# Patient Record
Sex: MALE | Race: BLACK OR AFRICAN AMERICAN | NOT HISPANIC OR LATINO | URBAN - METROPOLITAN AREA
[De-identification: names, ages, dates, MRNs, and addresses within clinical notes are randomized per-mention and may not be internally consistent; named-entity substitution may affect disease eponyms.]

---

## 2020-03-02 ENCOUNTER — OUTPATIENT (OUTPATIENT)
Dept: OUTPATIENT SERVICES | Facility: HOSPITAL | Age: 65
LOS: 1 days | Discharge: HOME | End: 2020-03-02
Payer: COMMERCIAL

## 2020-03-02 ENCOUNTER — EMERGENCY (EMERGENCY)
Facility: HOSPITAL | Age: 65
LOS: 0 days | Discharge: HOME | End: 2020-03-02
Attending: EMERGENCY MEDICINE
Payer: COMMERCIAL

## 2020-03-02 VITALS
RESPIRATION RATE: 18 BRPM | OXYGEN SATURATION: 98 % | WEIGHT: 182.76 LBS | TEMPERATURE: 98 F | HEART RATE: 98 BPM | HEIGHT: 68 IN | SYSTOLIC BLOOD PRESSURE: 112 MMHG | DIASTOLIC BLOOD PRESSURE: 53 MMHG

## 2020-03-02 VITALS
SYSTOLIC BLOOD PRESSURE: 137 MMHG | HEIGHT: 68 IN | TEMPERATURE: 98 F | DIASTOLIC BLOOD PRESSURE: 90 MMHG | OXYGEN SATURATION: 96 % | RESPIRATION RATE: 16 BRPM | HEART RATE: 92 BPM | WEIGHT: 179.9 LBS

## 2020-03-02 DIAGNOSIS — R03.0 ELEVATED BLOOD-PRESSURE READING, WITHOUT DIAGNOSIS OF HYPERTENSION: ICD-10-CM

## 2020-03-02 DIAGNOSIS — Z01.818 ENCOUNTER FOR OTHER PREPROCEDURAL EXAMINATION: ICD-10-CM

## 2020-03-02 DIAGNOSIS — R53.1 WEAKNESS: ICD-10-CM

## 2020-03-02 DIAGNOSIS — I11.0 HYPERTENSIVE HEART DISEASE WITH HEART FAILURE: ICD-10-CM

## 2020-03-02 DIAGNOSIS — E78.5 HYPERLIPIDEMIA, UNSPECIFIED: ICD-10-CM

## 2020-03-02 DIAGNOSIS — I25.10 ATHEROSCLEROTIC HEART DISEASE OF NATIVE CORONARY ARTERY WITHOUT ANGINA PECTORIS: ICD-10-CM

## 2020-03-02 DIAGNOSIS — Z95.5 PRESENCE OF CORONARY ANGIOPLASTY IMPLANT AND GRAFT: Chronic | ICD-10-CM

## 2020-03-02 DIAGNOSIS — Z95.5 PRESENCE OF CORONARY ANGIOPLASTY IMPLANT AND GRAFT: ICD-10-CM

## 2020-03-02 DIAGNOSIS — I10 ESSENTIAL (PRIMARY) HYPERTENSION: ICD-10-CM

## 2020-03-02 DIAGNOSIS — Z79.82 LONG TERM (CURRENT) USE OF ASPIRIN: ICD-10-CM

## 2020-03-02 DIAGNOSIS — M16.12 UNILATERAL PRIMARY OSTEOARTHRITIS, LEFT HIP: ICD-10-CM

## 2020-03-02 DIAGNOSIS — I50.40 UNSPECIFIED COMBINED SYSTOLIC (CONGESTIVE) AND DIASTOLIC (CONGESTIVE) HEART FAILURE: ICD-10-CM

## 2020-03-02 DIAGNOSIS — I25.5 ISCHEMIC CARDIOMYOPATHY: ICD-10-CM

## 2020-03-02 LAB
ANION GAP SERPL CALC-SCNC: 5 MMOL/L — SIGNIFICANT CHANGE UP (ref 5–17)
APTT BLD: 35.1 SEC — SIGNIFICANT CHANGE UP (ref 28.5–37)
BLD GP AB SCN SERPL QL: SIGNIFICANT CHANGE UP
BUN SERPL-MCNC: 14 MG/DL — SIGNIFICANT CHANGE UP (ref 7–23)
CALCIUM SERPL-MCNC: 8.9 MG/DL — SIGNIFICANT CHANGE UP (ref 8.5–10.1)
CHLORIDE SERPL-SCNC: 108 MMOL/L — SIGNIFICANT CHANGE UP (ref 96–108)
CO2 SERPL-SCNC: 27 MMOL/L — SIGNIFICANT CHANGE UP (ref 22–31)
CREAT SERPL-MCNC: 1.12 MG/DL — SIGNIFICANT CHANGE UP (ref 0.5–1.3)
GLUCOSE SERPL-MCNC: 79 MG/DL — SIGNIFICANT CHANGE UP (ref 70–99)
HCT VFR BLD CALC: 41.1 % — SIGNIFICANT CHANGE UP (ref 39–50)
HGB BLD-MCNC: 13 G/DL — SIGNIFICANT CHANGE UP (ref 13–17)
INR BLD: 1.17 RATIO — HIGH (ref 0.88–1.16)
MCHC RBC-ENTMCNC: 26.3 PG — LOW (ref 27–34)
MCHC RBC-ENTMCNC: 31.6 GM/DL — LOW (ref 32–36)
MCV RBC AUTO: 83 FL — SIGNIFICANT CHANGE UP (ref 80–100)
NRBC # BLD: 0 /100 WBCS — SIGNIFICANT CHANGE UP (ref 0–0)
PLATELET # BLD AUTO: 203 K/UL — SIGNIFICANT CHANGE UP (ref 150–400)
POTASSIUM SERPL-MCNC: 3.6 MMOL/L — SIGNIFICANT CHANGE UP (ref 3.5–5.3)
POTASSIUM SERPL-SCNC: 3.6 MMOL/L — SIGNIFICANT CHANGE UP (ref 3.5–5.3)
PROTHROM AB SERPL-ACNC: 13.2 SEC — HIGH (ref 10–12.9)
RBC # BLD: 4.95 M/UL — SIGNIFICANT CHANGE UP (ref 4.2–5.8)
RBC # FLD: 14.5 % — SIGNIFICANT CHANGE UP (ref 10.3–14.5)
SODIUM SERPL-SCNC: 140 MMOL/L — SIGNIFICANT CHANGE UP (ref 135–145)
WBC # BLD: 4.19 K/UL — SIGNIFICANT CHANGE UP (ref 3.8–10.5)
WBC # FLD AUTO: 4.19 K/UL — SIGNIFICANT CHANGE UP (ref 3.8–10.5)

## 2020-03-02 PROCEDURE — 99283 EMERGENCY DEPT VISIT LOW MDM: CPT

## 2020-03-02 PROCEDURE — 93010 ELECTROCARDIOGRAM REPORT: CPT

## 2020-03-02 RX ORDER — METOPROLOL TARTRATE 50 MG
1 TABLET ORAL
Qty: 0 | Refills: 0 | DISCHARGE

## 2020-03-02 NOTE — OCCUPATIONAL THERAPY INITIAL EVALUATION ADULT - ANTICIPATED DISCHARGE DISPOSITION, OT EVAL
STR to prevent falls, improve balance, muscle strength, ROM and endurance in order for the pt to perform ADL management and functional mobility in a safe manner.

## 2020-03-02 NOTE — PHYSICAL THERAPY INITIAL EVALUATION ADULT - ACTIVE RANGE OF MOTION EXAMINATION, REHAB EVAL
bilateral lower extremity Active ROM was WNL (within normal limits)/cece. upper extremity Active ROM was WNL (within normal limits)

## 2020-03-02 NOTE — PHYSICAL THERAPY INITIAL EVALUATION ADULT - PERTINENT HX OF CURRENT PROBLEM, REHAB EVAL
Patient attends pre-op testing today following consult c Dr. Crane due to chronic pain to L hip. Elective L ALICIA is now scheduled in this facility for 3/16/2020.

## 2020-03-02 NOTE — OCCUPATIONAL THERAPY INITIAL EVALUATION ADULT - FINE MOTOR COORDINATION, RIGHT HAND, DIADOCHOKINESIS SKILLS, OT EVAL
Spoke with patient. Left sided back/flank pain. Hx kidney stones that started similarly. Urine flow is normal. Does not believe he has passed stone. No fever. No abd pain. Advise dto monitor fever, urinary flow and pain. If worsens, go to ER. Pt agreeable. Will try Flomax and Vicodin.
normal performance

## 2020-03-02 NOTE — ED PROVIDER NOTE - PMH
CAD (coronary artery disease)  stent 2008, 2103 ( rca)  Diastolic heart failure    HTN (hypertension)    Hyperlipidemia    Ischemic cardiomyopathy    Systolic heart failure

## 2020-03-02 NOTE — OCCUPATIONAL THERAPY INITIAL EVALUATION ADULT - PERTINENT HX OF CURRENT PROBLEM, REHAB EVAL
Pt is slated for elective surgery for left THR at a later date with MD Crane due to OA, chronic pain and DJD. Pt denies any recent fall but reports constant buckling in LLE.

## 2020-03-02 NOTE — H&P PST ADULT - PULMONARY EMBOLUS
no

## 2020-03-02 NOTE — ED PROVIDER NOTE - PATIENT PORTAL LINK FT
You can access the FollowMyHealth Patient Portal offered by [facility] by registering at the following website: [email or web address]. By joining [facility]’s FollowMyHealth portal, you will also be able to view your health information using other applications (apps) compatible with our system.

## 2020-03-02 NOTE — H&P PST ADULT - NSICDXPROBLEM_GEN_ALL_CORE_FT
PROBLEM DIAGNOSES  Problem: Unilateral primary osteoarthritis, left hip  Assessment and Plan: left total hip replacement on 3/16/2020    Problem: Preop examination  Assessment and Plan: labs - cbc,pt/ptt,bmp,t&s,nose cx,ekg  M/C required  preop 3 day hibiclens instruction reviewed and given .instructed on if  nose cx positive use mupuricin 5 days and checklist given  take routine meds DOS with sips of water. avoid NSAID and OTC supplements. verbalized understanding  information on proper nutrition , increase protein and better food choices provided in packet

## 2020-03-02 NOTE — ED PROVIDER NOTE - CLINICAL SUMMARY MEDICAL DECISION MAKING FREE TEXT BOX
Weakness s/p HTN medications. PST today, had all labs drawn, WNL. Pt feels like his normal self after rest after HTN medications and denies CP/SOB, wants to go home. Will DC with f/u PMD. Scheduled for hip replacement 3/16.

## 2020-03-02 NOTE — H&P PST ADULT - HISTORY OF PRESENT ILLNESS
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 He c/o of severe pain to his left hip.  He is scheduled for a left hip replacement on 3/16/2020.    Goal:  To dance, to take kids to the park. 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 He c/o of severe pain to his left hip.  He is scheduled for a left hip replacement on 3/16/2020.    Goal:  To dance, to take kids to the park.

## 2020-03-02 NOTE — ED PROVIDER NOTE - CARE PLAN
Principal Discharge DX:	Weakness Principal Discharge DX:	Weakness  Secondary Diagnosis:	HTN (hypertension)

## 2020-03-02 NOTE — ED PROVIDER NOTE - NSFOLLOWUPINSTRUCTIONS_ED_ALL_ED_FT
You were seen in the ER for weakness.  You took medications prior to feeling weak.  Return to ER for life threatening emergencies.  Follow up with your doctor.

## 2020-03-02 NOTE — PHYSICAL THERAPY INITIAL EVALUATION ADULT - ADDITIONAL COMMENTS
Pt lives with his wife and children (whom can provide limited assist upon D/C home) in an apt bldg c elevator, no entry step, 1 level inside. Pt has a tub/shower combo with a retractable shower head, comfort toilet seat height, & +grab bar. Pt states he is currently independent with all functional mobility including community ambulation with straight cane with prosthesis to RLE. Pt states he is independent with ADL's as well c slight difficulty with lower body dressing. Pt is right hand dominant, wears eye glasses, drives, & is retired. Pt reports daily 8/10 pain & states it is worse with any activity. Pt endorses taking narcotics for pain management. Goal of therapy: manage pain & improve functional mobility.

## 2020-03-02 NOTE — ED PROVIDER NOTE - NS ED ROS FT
ROS: denies HA, dizziness, fevers/chills, nausea/vomiting, chest pain, SOB, diaphoresis, abdominal pain, diarrhea, joint pain, neuro deficits, dysuria/hematuria, rash    +weakness, resolved

## 2020-03-02 NOTE — OCCUPATIONAL THERAPY INITIAL EVALUATION ADULT - LIVES WITH, PROFILE
in an apartment with no steps to enter. Once inside, pt takes the elevator to his floor to reach his dwelling. The bathroom is handicap accessible and has a tub/shower combination, grab bars, retractable shower head and comfort toilet. Pt states that 3:1 commode can fit over the toilet./children/spouse

## 2020-03-02 NOTE — OCCUPATIONAL THERAPY INITIAL EVALUATION ADULT - GENERAL OBSERVATIONS, REHAB EVAL
Chart reviewed. Patient encountered seated in wheelchair in [facility] waiting area with NAD. Patient underwent occupational therapy pre-operative consultation to determine current functional ADL limitations in order to provide the right equipment for patient to perform functional ADL post operation.

## 2020-03-02 NOTE — H&P PST ADULT - NSICDXPASTMEDICALHX_GEN_ALL_CORE_FT
PAST MEDICAL HISTORY:  CAD (coronary artery disease) stent 2008, 2103 ( rca)    Diastolic heart failure     HTN (hypertension)     Hyperlipidemia     Ischemic cardiomyopathy     Systolic heart failure

## 2020-03-02 NOTE — OCCUPATIONAL THERAPY INITIAL EVALUATION ADULT - ADDITIONAL COMMENTS
At this time, pt is functioning in his roles, driving, self sufficient & ambulating independently with a SAC for community mobility. Pt states that he holds on to the walls at home so that he doesn't fall. 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 Pt has 8/10 pain at rest that exacerbates to 10/10 pain with prolonged activities, changes in weather and this affects his sleep. Pt takes Aleve PRN to alleviate pain. Pt is right hand dominant and wears glasses for reading/distance. At this time, pt is functioning in his roles, driving, self sufficient & ambulating independently with a SAC for community mobility. Pt states that he holds onto the walls at home so that he doesn't fall. 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 Pt has 8/10 pain in left hip at rest that exacerbates to 10/10 pain with prolonged activities, changes in weather and this affects his sleep. Pt takes Aleve PRN to alleviate pain. Pt is right hand dominant and wears glasses for reading/distance. At this time, pt is functioning in his roles, driving, self sufficient & ambulating independently with a SAC for community mobility. Pt states that he holds onto the walls at home so that he doesn't fall. 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 Pt has 8/10 pain in left hip at rest that exacerbates to 10/10 pain with prolonged activities, changes in weather and this affects his sleep. Pt takes Aleve PRN to alleviate pain. Pt is right hand dominant and wears glasses for reading/distance. At this time, pt is functioning in his roles, driving, self sufficient & ambulating independently with a SAC for community mobility. Pt states that he holds onto the walls at home so that he doesn't fall. 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 Pt has 8/10 pain in left hip at rest that exacerbates to 10/10 pain with prolonged activities, changes in weather and this affects his sleep. Pt takes Aleve PRN to alleviate pain. Pt is right hand dominant and wears glasses for reading/distance. Pt scores 80% of patient specific scale

## 2020-03-02 NOTE — PHYSICAL THERAPY INITIAL EVALUATION ADULT - GAIT DEVIATIONS NOTED, PT EVAL
decreased latisha/decreased step length/increased stride width/decreased stride length/decreased weight-shifting ability

## 2020-03-02 NOTE — OCCUPATIONAL THERAPY INITIAL EVALUATION ADULT - SOCIAL CONCERNS
Pt voiced concerns about his recovery. Pt states that his wife will only be able to assist him sometimes at home.

## 2020-03-02 NOTE — PHYSICAL THERAPY INITIAL EVALUATION ADULT - ASSISTIVE DEVICE FOR TRANSFER: STAND/SIT, REHAB EVAL
straight cane Xolair Pregnancy And Lactation Text: This medication is Pregnancy Category B and is considered safe during pregnancy. This medication is excreted in breast milk.

## 2020-03-02 NOTE — OCCUPATIONAL THERAPY INITIAL EVALUATION ADULT - COORDINATION ASSESSED, REHAB EVAL
finger to nose/heel to shin/intact, diminished in LLE due to pain and unable to perform in RLE due to prosthesis

## 2020-03-02 NOTE — ED PROVIDER NOTE - OBJECTIVE STATEMENT
65yo M with hx of CAD s/p stents, DHF/SHF, HTN/HLD presents today with weakness. He was at [facility], had all of the lab work and test done. His BP was elevated, took two home meds - HCTZ, metoprolol. Soon after his BP was lower, he was weaker. He was brought to ED for further eval. He feels better now after lying in bed. Denies CP/SOB, denies LOC. Feels comfortable going home.

## 2020-03-02 NOTE — H&P PST ADULT - ASSESSMENT
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 He c/o of severe pain to his left hip.  He is scheduled for a left hip replacement on 3/16/2020. 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 He c/o of severe pain to his left hip.  He is scheduled for a left hip replacement on 3/16/2020.    CAPRINI SCORE [CLOT]    AGE RELATED RISK FACTORS                                                       MOBILITY RELATED FACTORS  [ ] Age 41-60 years                                            (1 Point)                  [ ] Bed rest                                                        (1 Point)  [ x] Age: 61-74 years                                           (2 Points)                 [ ] Plaster cast                                                   (2 Points)  [ ] Age= 75 years                                              (3 Points)                 [ ] Bed bound for more than 72 hours                 (2 Points)    DISEASE RELATED RISK FACTORS                                               GENDER SPECIFIC FACTORS  [ ] Edema in the lower extremities                       (1 Point)                  [ ] Pregnancy                                                     (1 Point)  [ ] Varicose veins                                               (1 Point)                  [ ] Post-partum < 6 weeks                                   (1 Point)             [ ] BMI > 25 Kg/m2                                            (1 Point)                  [ ] Hormonal therapy  or oral contraception          (1 Point)                 [ ] Sepsis (in the previous month)                        (1 Point)                  [ ] History of pregnancy complications                 (1 point)  [ ] Pneumonia or serious lung disease                                               [ ] Unexplained or recurrent                     (1 Point)           (in the previous month)                               (1 Point)  [ ] Abnormal pulmonary function test                     (1 Point)                 SURGERY RELATED RISK FACTORS  [ ] Acute myocardial infarction                              (1 Point)                 [ ]  Section                                             (1 Point)  x[ ] Congestive heart failure (in the previous month)  (1 Point)               [ ] Minor surgery                                                  (1 Point)   [ ] Inflammatory bowel disease                             (1 Point)                 [ ] Arthroscopic surgery                                        (2 Points)  [ ] Central venous access                                      (2 Points)                [ ] General surgery lasting more than 45 minutes   (2 Points)       [ ] Stroke (in the previous month)                          (5 Points)               [x ] Elective arthroplasty                                         (5 Points)                                                                                                                                               HEMATOLOGY RELATED FACTORS                                                 TRAUMA RELATED RISK FACTORS  [ ] Prior episodes of VTE                                     (3 Points)                [ ] Fracture of the hip, pelvis, or leg                       (5 Points)  [ ] Positive family history for VTE                         (3 Points)                 [ ] Acute spinal cord injury (in the previous month)  (5 Points)  [ ] Prothrombin 02427 A                                     (3 Points)                 [ ] Paralysis  (less than 1 month)                             (5 Points)  [ ] Factor V Leiden                                             (3 Points)                  [ ] Multiple Trauma within 1 month                        (5 Points)  [ ] Lupus anticoagulants                                     (3 Points)                                                           [ ] Anticardiolipin antibodies                               (3 Points)                                                       [ ] High homocysteine in the blood                      (3 Points)                                             [ ] Other congenital or acquired thrombophilia      (3 Points)                                                [ ] Heparin induced thrombocytopenia                  (3 Points)                                          Total Score [    8      ]    Caprini Score 0 - 2:  Low Risk, No VTE Prophylaxis required for most patients, encourage ambulation  Caprini Score 3 - 6:  At Risk, pharmacologic VTE prophylaxis is indicated for most patients (in the absence of a contraindication)  Caprini Score Greater than or = 7:  High Risk, pharmacologic VTE prophylaxis is indicated for most patients (in the absence of a contraindication)    Caprini score indicates that the patient is high risk for VTE event ( score 6 or greater). Surgical patient's in this group will benefit from both pharmacologic prophylaxis and intermittent compression devices . Surgical team will determine the balance between VTE  risk and bleeding risk and other clinical considerations

## 2020-03-03 LAB
HBA1C BLD-MCNC: 6 % — HIGH (ref 4–5.6)
MRSA PCR RESULT.: SIGNIFICANT CHANGE UP
S AUREUS DNA NOSE QL NAA+PROBE: DETECTED

## 2020-03-03 RX ORDER — MUPIROCIN 20 MG/G
1 OINTMENT TOPICAL
Qty: 22 | Refills: 0
Start: 2020-03-03 | End: 2020-03-07

## 2020-03-10 ENCOUNTER — OUTPATIENT (OUTPATIENT)
Dept: OUTPATIENT SERVICES | Facility: HOSPITAL | Age: 65
LOS: 1 days | Discharge: HOME | End: 2020-03-10
Payer: COMMERCIAL

## 2020-03-10 DIAGNOSIS — Z95.5 PRESENCE OF CORONARY ANGIOPLASTY IMPLANT AND GRAFT: Chronic | ICD-10-CM

## 2020-03-10 LAB
ANION GAP SERPL CALC-SCNC: 13 MMO/L — SIGNIFICANT CHANGE UP (ref 7–14)
BUN SERPL-MCNC: 11 MG/DL — SIGNIFICANT CHANGE UP (ref 7–23)
CALCIUM SERPL-MCNC: 9.2 MG/DL — SIGNIFICANT CHANGE UP (ref 8.4–10.5)
CHLORIDE SERPL-SCNC: 102 MMOL/L — SIGNIFICANT CHANGE UP (ref 98–107)
CO2 SERPL-SCNC: 21 MMOL/L — LOW (ref 22–31)
CREAT SERPL-MCNC: 1.1 MG/DL — SIGNIFICANT CHANGE UP (ref 0.5–1.3)
GLUCOSE SERPL-MCNC: 100 MG/DL — HIGH (ref 70–99)
HCT VFR BLD CALC: 41 % — SIGNIFICANT CHANGE UP (ref 39–50)
HGB BLD-MCNC: 13.1 G/DL — SIGNIFICANT CHANGE UP (ref 13–17)
MCHC RBC-ENTMCNC: 26.4 PG — LOW (ref 27–34)
MCHC RBC-ENTMCNC: 32 % — SIGNIFICANT CHANGE UP (ref 32–36)
MCV RBC AUTO: 82.7 FL — SIGNIFICANT CHANGE UP (ref 80–100)
NRBC # FLD: 0 K/UL — SIGNIFICANT CHANGE UP (ref 0–0)
PLATELET # BLD AUTO: 195 K/UL — SIGNIFICANT CHANGE UP (ref 150–400)
PMV BLD: 11.3 FL — SIGNIFICANT CHANGE UP (ref 7–13)
POTASSIUM SERPL-MCNC: 3.8 MMOL/L — SIGNIFICANT CHANGE UP (ref 3.5–5.3)
POTASSIUM SERPL-MCNC: 5.6 MMOL/L — HIGH (ref 3.5–5.3)
POTASSIUM SERPL-SCNC: 3.8 MMOL/L — SIGNIFICANT CHANGE UP (ref 3.5–5.3)
POTASSIUM SERPL-SCNC: 5.6 MMOL/L — HIGH (ref 3.5–5.3)
RBC # BLD: 4.96 M/UL — SIGNIFICANT CHANGE UP (ref 4.2–5.8)
RBC # FLD: 14.3 % — SIGNIFICANT CHANGE UP (ref 10.3–14.5)
SODIUM SERPL-SCNC: 136 MMOL/L — SIGNIFICANT CHANGE UP (ref 135–145)
WBC # BLD: 3.4 K/UL — LOW (ref 3.8–10.5)
WBC # FLD AUTO: 3.4 K/UL — LOW (ref 3.8–10.5)

## 2020-03-10 PROCEDURE — 76937 US GUIDE VASCULAR ACCESS: CPT | Mod: 26

## 2020-03-10 PROCEDURE — 93460 R&L HRT ART/VENTRICLE ANGIO: CPT | Mod: 26

## 2020-03-10 PROCEDURE — 93010 ELECTROCARDIOGRAM REPORT: CPT

## 2020-03-10 RX ORDER — SODIUM CHLORIDE 9 MG/ML
3 INJECTION INTRAMUSCULAR; INTRAVENOUS; SUBCUTANEOUS EVERY 8 HOURS
Refills: 0 | Status: DISCONTINUED | OUTPATIENT
Start: 2020-03-10 | End: 2020-03-25

## 2020-03-10 RX ORDER — HYDRALAZINE HCL 50 MG
1 TABLET ORAL
Qty: 0 | Refills: 0 | DISCHARGE

## 2020-03-10 RX ORDER — SACUBITRIL AND VALSARTAN 24; 26 MG/1; MG/1
1 TABLET, FILM COATED ORAL
Qty: 0 | Refills: 0 | DISCHARGE

## 2020-03-10 RX ORDER — ASPIRIN/CALCIUM CARB/MAGNESIUM 324 MG
81 TABLET ORAL ONCE
Refills: 0 | Status: COMPLETED | OUTPATIENT
Start: 2020-03-10 | End: 2020-03-10

## 2020-03-10 RX ADMIN — SODIUM CHLORIDE 3 MILLILITER(S): 9 INJECTION INTRAMUSCULAR; INTRAVENOUS; SUBCUTANEOUS at 15:33

## 2020-03-10 RX ADMIN — Medication 81 MILLIGRAM(S): at 11:14

## 2020-03-10 NOTE — H&P CARDIOLOGY - NEGATIVE NEUROLOGICAL SYMPTOMS
no transient paralysis/no loss of sensation/no tremors/no headache/no confusion/no hemiparesis/no loss of consciousness/no paresthesias/no generalized seizures/no weakness/no focal seizures/no syncope/no vertigo/no difficulty walking

## 2020-03-10 NOTE — H&P CARDIOLOGY - PMH
CAD (coronary artery disease)  stent 2008, 2103 ( rca)  HTN (hypertension)    Hyperlipidemia    Ischemic cardiomyopathy    Systolic heart failure

## 2020-03-10 NOTE — H&P CARDIOLOGY - HISTORY OF PRESENT ILLNESS
63 y/o M with PMH of HTN, HLD, CAD(s/p 1 stent placed in 2011), HFrEF(EF of 20% on Entresto) presented to [facility] for elective cardiac catherization for preop clearance for left hip replacement. Patient stated that he has been having SOB 2/2 to his "mobility issues" due to the arhtirits from the hip 63 y/o M with PMH of HTN, HLD, CAD(s/p 1 stent placed in 2011), HFrEF(EF of 20% on Entresto), Bilateral lower extremity defects and has a prosthesis in his right leg presented to [facility] for elective cardiac catherization for preop clearance for left hip replacement. Patient stated that he has been having SOB 2/2 to his "mobility issues" due to the arhtirits from the hip 65 y/o M with PMH of HTN, HLD, CAD(s/p 1 stent placed in 2011), HFrEF(EF of 20% on Entresto), Bilateral lower extremity defects and has a prosthesis in his right leg presented to [facility] for elective cardiac catherization for preop clearance for left hip replacement. Patient stated that he has been having SOB 2/2 to his "mobility issues" due to the arthritis from the hip otherwise does not have orthopnea or MICHEL. Patient had undergone a stress test which was abnormal and is now sent to [facility] for angiogram. Patient otherwise denied any complaints such as CP, SOB, fevers, chills, N/V/D/C, abdominal pain, dysuria, melena, hematochezia, recent travel, sick contact, pleuritic or positional chest pain.

## 2020-03-10 NOTE — H&P CARDIOLOGY - GASTROINTESTINAL DETAILS
no guarding/no rebound tenderness/no rigidity/normal/nontender/no distention/bowel sounds normal/soft

## 2020-03-10 NOTE — H&P CARDIOLOGY - RS GEN PE MLT RESP DETAILS PC
good air movement/no intercostal retractions/no chest wall tenderness/respirations non-labored/breath sounds equal/no rhonchi/no rales/no wheezes/normal/clear to auscultation bilaterally/airway patent

## 2020-03-10 NOTE — H&P CARDIOLOGY - EKG AND INTERPRETATION
Sinus rhythm with occasional PVC at a rate of 99, possible LAE, LVH and TWI in lead III, V5-V6 with QTc of 492

## 2020-03-10 NOTE — H&P CARDIOLOGY - NEGATIVE CARDIOVASCULAR SYMPTOMS
no paroxysmal nocturnal dyspnea/no dyspnea on exertion/no orthopnea/no palpitations/no peripheral edema/no chest pain

## 2020-06-05 ENCOUNTER — OUTPATIENT (OUTPATIENT)
Dept: OUTPATIENT SERVICES | Facility: HOSPITAL | Age: 65
LOS: 1 days | Discharge: HOME | End: 2020-06-05
Payer: COMMERCIAL

## 2020-06-05 VITALS
OXYGEN SATURATION: 98 % | RESPIRATION RATE: 18 BRPM | HEART RATE: 81 BPM | HEIGHT: 68 IN | TEMPERATURE: 98 F | WEIGHT: 186.51 LBS

## 2020-06-05 DIAGNOSIS — Z01.818 ENCOUNTER FOR OTHER PREPROCEDURAL EXAMINATION: ICD-10-CM

## 2020-06-05 DIAGNOSIS — Z95.5 PRESENCE OF CORONARY ANGIOPLASTY IMPLANT AND GRAFT: Chronic | ICD-10-CM

## 2020-06-05 DIAGNOSIS — M16.12 UNILATERAL PRIMARY OSTEOARTHRITIS, LEFT HIP: ICD-10-CM

## 2020-06-05 LAB
A1C WITH ESTIMATED AVERAGE GLUCOSE RESULT: 6.1 % — HIGH (ref 4–5.6)
ANION GAP SERPL CALC-SCNC: 5 MMOL/L — SIGNIFICANT CHANGE UP (ref 5–17)
APTT BLD: 34 SEC — SIGNIFICANT CHANGE UP (ref 27.5–36.3)
BASOPHILS # BLD AUTO: 0.03 K/UL — SIGNIFICANT CHANGE UP (ref 0–0.2)
BASOPHILS NFR BLD AUTO: 0.8 % — SIGNIFICANT CHANGE UP (ref 0–2)
BUN SERPL-MCNC: 14 MG/DL — SIGNIFICANT CHANGE UP (ref 7–23)
CALCIUM SERPL-MCNC: 8.7 MG/DL — SIGNIFICANT CHANGE UP (ref 8.5–10.1)
CHLORIDE SERPL-SCNC: 108 MMOL/L — SIGNIFICANT CHANGE UP (ref 96–108)
CO2 SERPL-SCNC: 27 MMOL/L — SIGNIFICANT CHANGE UP (ref 22–31)
CREAT SERPL-MCNC: 1.02 MG/DL — SIGNIFICANT CHANGE UP (ref 0.5–1.3)
EOSINOPHIL # BLD AUTO: 0.1 K/UL — SIGNIFICANT CHANGE UP (ref 0–0.5)
EOSINOPHIL NFR BLD AUTO: 2.6 % — SIGNIFICANT CHANGE UP (ref 0–6)
ESTIMATED AVERAGE GLUCOSE: 128 MG/DL — HIGH (ref 68–114)
GLUCOSE SERPL-MCNC: 96 MG/DL — SIGNIFICANT CHANGE UP (ref 70–99)
HCT VFR BLD CALC: 40.7 % — SIGNIFICANT CHANGE UP (ref 39–50)
HGB BLD-MCNC: 13.2 G/DL — SIGNIFICANT CHANGE UP (ref 13–17)
IMM GRANULOCYTES NFR BLD AUTO: 0.3 % — SIGNIFICANT CHANGE UP (ref 0–1.5)
INR BLD: 1.12 RATIO — SIGNIFICANT CHANGE UP (ref 0.88–1.16)
LYMPHOCYTES # BLD AUTO: 1.49 K/UL — SIGNIFICANT CHANGE UP (ref 1–3.3)
LYMPHOCYTES # BLD AUTO: 39.4 % — SIGNIFICANT CHANGE UP (ref 13–44)
MCHC RBC-ENTMCNC: 26.6 PG — LOW (ref 27–34)
MCHC RBC-ENTMCNC: 32.4 GM/DL — SIGNIFICANT CHANGE UP (ref 32–36)
MCV RBC AUTO: 82.1 FL — SIGNIFICANT CHANGE UP (ref 80–100)
MONOCYTES # BLD AUTO: 0.5 K/UL — SIGNIFICANT CHANGE UP (ref 0–0.9)
MONOCYTES NFR BLD AUTO: 13.2 % — SIGNIFICANT CHANGE UP (ref 2–14)
NEUTROPHILS # BLD AUTO: 1.65 K/UL — LOW (ref 1.8–7.4)
NEUTROPHILS NFR BLD AUTO: 43.7 % — SIGNIFICANT CHANGE UP (ref 43–77)
NRBC # BLD: 0 /100 WBCS — SIGNIFICANT CHANGE UP (ref 0–0)
PLATELET # BLD AUTO: 188 K/UL — SIGNIFICANT CHANGE UP (ref 150–400)
POTASSIUM SERPL-MCNC: 3.7 MMOL/L — SIGNIFICANT CHANGE UP (ref 3.5–5.3)
POTASSIUM SERPL-SCNC: 3.7 MMOL/L — SIGNIFICANT CHANGE UP (ref 3.5–5.3)
PROTHROM AB SERPL-ACNC: 12.6 SEC — SIGNIFICANT CHANGE UP (ref 10–12.9)
RBC # BLD: 4.96 M/UL — SIGNIFICANT CHANGE UP (ref 4.2–5.8)
RBC # FLD: 14.3 % — SIGNIFICANT CHANGE UP (ref 10.3–14.5)
SODIUM SERPL-SCNC: 140 MMOL/L — SIGNIFICANT CHANGE UP (ref 135–145)
WBC # BLD: 3.78 K/UL — LOW (ref 3.8–10.5)
WBC # FLD AUTO: 3.78 K/UL — LOW (ref 3.8–10.5)

## 2020-06-05 PROCEDURE — 93010 ELECTROCARDIOGRAM REPORT: CPT

## 2020-06-05 NOTE — H&P PST ADULT - NSICDXPASTMEDICALHX_GEN_ALL_CORE_FT
PAST MEDICAL HISTORY:  CAD (coronary artery disease) stent 2008, 2103 ( rca)    HTN (hypertension)     Hyperlipidemia     Ischemic cardiomyopathy     Systolic heart failure

## 2020-06-05 NOTE — H&P PST ADULT - HISTORY OF PRESENT ILLNESS
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 He c/o of severe pain to his left hip.  He is scheduled for a left hip replacement    Goal:  To dance, to take kids to the park.     Denies recent travels- no fever, SOB, cough, diarrhea, rash- COVID Screen

## 2020-06-06 LAB
MRSA PCR RESULT.: SIGNIFICANT CHANGE UP
S AUREUS DNA NOSE QL NAA+PROBE: SIGNIFICANT CHANGE UP

## 2020-06-14 LAB — SARS-COV-2 RNA SPEC QL NAA+PROBE: SIGNIFICANT CHANGE UP

## 2020-07-21 ENCOUNTER — TRANSCRIPTION ENCOUNTER (OUTPATIENT)
Age: 65
End: 2020-07-21

## 2020-10-27 ENCOUNTER — OUTPATIENT (OUTPATIENT)
Dept: OUTPATIENT SERVICES | Facility: HOSPITAL | Age: 65
LOS: 1 days | Discharge: HOME | End: 2020-10-27

## 2020-10-27 VITALS
WEIGHT: 168.21 LBS | OXYGEN SATURATION: 98 % | HEIGHT: 68 IN | SYSTOLIC BLOOD PRESSURE: 150 MMHG | RESPIRATION RATE: 17 BRPM | HEART RATE: 81 BPM | DIASTOLIC BLOOD PRESSURE: 80 MMHG | TEMPERATURE: 98 F

## 2020-10-27 DIAGNOSIS — Z95.5 PRESENCE OF CORONARY ANGIOPLASTY IMPLANT AND GRAFT: Chronic | ICD-10-CM

## 2020-10-27 DIAGNOSIS — M16.12 UNILATERAL PRIMARY OSTEOARTHRITIS, LEFT HIP: ICD-10-CM

## 2020-10-27 DIAGNOSIS — Z01.818 ENCOUNTER FOR OTHER PREPROCEDURAL EXAMINATION: ICD-10-CM

## 2020-10-27 LAB
A1C WITH ESTIMATED AVERAGE GLUCOSE RESULT: 6.2 % — HIGH (ref 4–5.6)
ANION GAP SERPL CALC-SCNC: 5 MMOL/L — SIGNIFICANT CHANGE UP (ref 5–17)
APTT BLD: 35.3 SEC — SIGNIFICANT CHANGE UP (ref 27.5–35.5)
BLD GP AB SCN SERPL QL: SIGNIFICANT CHANGE UP
BUN SERPL-MCNC: 18 MG/DL — SIGNIFICANT CHANGE UP (ref 7–23)
CALCIUM SERPL-MCNC: 9.1 MG/DL — SIGNIFICANT CHANGE UP (ref 8.5–10.1)
CHLORIDE SERPL-SCNC: 106 MMOL/L — SIGNIFICANT CHANGE UP (ref 96–108)
CO2 SERPL-SCNC: 29 MMOL/L — SIGNIFICANT CHANGE UP (ref 22–31)
CREAT SERPL-MCNC: 1.49 MG/DL — HIGH (ref 0.5–1.3)
ESTIMATED AVERAGE GLUCOSE: 131 MG/DL — HIGH (ref 68–114)
GLUCOSE SERPL-MCNC: 96 MG/DL — SIGNIFICANT CHANGE UP (ref 70–99)
HCT VFR BLD CALC: 40.2 % — SIGNIFICANT CHANGE UP (ref 39–50)
HGB BLD-MCNC: 13 G/DL — SIGNIFICANT CHANGE UP (ref 13–17)
INR BLD: 1.08 RATIO — SIGNIFICANT CHANGE UP (ref 0.88–1.16)
MCHC RBC-ENTMCNC: 26.9 PG — LOW (ref 27–34)
MCHC RBC-ENTMCNC: 32.3 GM/DL — SIGNIFICANT CHANGE UP (ref 32–36)
MCV RBC AUTO: 83.2 FL — SIGNIFICANT CHANGE UP (ref 80–100)
MRSA PCR RESULT.: SIGNIFICANT CHANGE UP
NRBC # BLD: 0 /100 WBCS — SIGNIFICANT CHANGE UP (ref 0–0)
PLATELET # BLD AUTO: 218 K/UL — SIGNIFICANT CHANGE UP (ref 150–400)
POTASSIUM SERPL-MCNC: 3.7 MMOL/L — SIGNIFICANT CHANGE UP (ref 3.5–5.3)
POTASSIUM SERPL-SCNC: 3.7 MMOL/L — SIGNIFICANT CHANGE UP (ref 3.5–5.3)
PROTHROM AB SERPL-ACNC: 12.5 SEC — SIGNIFICANT CHANGE UP (ref 10.6–13.6)
RBC # BLD: 4.83 M/UL — SIGNIFICANT CHANGE UP (ref 4.2–5.8)
RBC # FLD: 13.4 % — SIGNIFICANT CHANGE UP (ref 10.3–14.5)
S AUREUS DNA NOSE QL NAA+PROBE: SIGNIFICANT CHANGE UP
SODIUM SERPL-SCNC: 140 MMOL/L — SIGNIFICANT CHANGE UP (ref 135–145)
WBC # BLD: 3.67 K/UL — LOW (ref 3.8–10.5)
WBC # FLD AUTO: 3.67 K/UL — LOW (ref 3.8–10.5)

## 2020-10-27 RX ORDER — METOPROLOL TARTRATE 50 MG
1 TABLET ORAL
Qty: 0 | Refills: 0 | DISCHARGE

## 2020-10-27 RX ORDER — SACUBITRIL AND VALSARTAN 24; 26 MG/1; MG/1
1 TABLET, FILM COATED ORAL
Qty: 0 | Refills: 0 | DISCHARGE

## 2020-10-27 RX ORDER — ATORVASTATIN CALCIUM 80 MG/1
1 TABLET, FILM COATED ORAL
Qty: 0 | Refills: 0 | DISCHARGE

## 2020-10-27 NOTE — H&P PST ADULT - ASSESSMENT
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 He c/o of severe pain to his left hip.  He is scheduled for a left hip replacement     CAPRINI SCORE [CLOT]    AGE RELATED RISK FACTORS                                                       MOBILITY RELATED FACTORS  [ ] Age 41-60 years                                            (1 Point)                  [ ] Bed rest                                                        (1 Point)  [ x] Age: 61-74 years                                           (2 Points)                 [ ] Plaster cast                                                   (2 Points)  [ ] Age= 75 years                                              (3 Points)                 [ ] Bed bound for more than 72 hours                 (2 Points)    DISEASE RELATED RISK FACTORS                                               GENDER SPECIFIC FACTORS  [ ] Edema in the lower extremities                       (1 Point)                  [ ] Pregnancy                                                     (1 Point)  [ ] Varicose veins                                               (1 Point)                  [ ] Post-partum < 6 weeks                                   (1 Point)             [ ] BMI > 25 Kg/m2                                            (1 Point)                  [ ] Hormonal therapy  or oral contraception          (1 Point)                 [ ] Sepsis (in the previous month)                        (1 Point)                  [ ] History of pregnancy complications                 (1 point)  [ ] Pneumonia or serious lung disease                                               [ ] Unexplained or recurrent                     (1 Point)           (in the previous month)                               (1 Point)  [ ] Abnormal pulmonary function test                     (1 Point)                 SURGERY RELATED RISK FACTORS  [ ] Acute myocardial infarction                              (1 Point)                 [ ]  Section                                             (1 Point)  x[ ] Congestive heart failure (in the previous month)  (1 Point)               [ ] Minor surgery                                                  (1 Point)   [ ] Inflammatory bowel disease                             (1 Point)                 [ ] Arthroscopic surgery                                        (2 Points)  [ ] Central venous access                                      (2 Points)                [ ] General surgery lasting more than 45 minutes   (2 Points)       [ ] Stroke (in the previous month)                          (5 Points)               [x ] Elective arthroplasty                                         (5 Points)                                                                                                                                               HEMATOLOGY RELATED FACTORS                                                 TRAUMA RELATED RISK FACTORS  [ ] Prior episodes of VTE                                     (3 Points)                [ ] Fracture of the hip, pelvis, or leg                       (5 Points)  [ ] Positive family history for VTE                         (3 Points)                 [ ] Acute spinal cord injury (in the previous month)  (5 Points)  [ ] Prothrombin 24732 A                                     (3 Points)                 [ ] Paralysis  (less than 1 month)                             (5 Points)  [ ] Factor V Leiden                                             (3 Points)                  [ ] Multiple Trauma within 1 month                        (5 Points)  [ ] Lupus anticoagulants                                     (3 Points)                                                           [ ] Anticardiolipin antibodies                               (3 Points)                                                       [ ] High homocysteine in the blood                      (3 Points)                                             [ ] Other congenital or acquired thrombophilia      (3 Points)                                                [ ] Heparin induced thrombocytopenia                  (3 Points)                                          Total Score [    8      ]    Caprini Score 0 - 2:  Low Risk, No VTE Prophylaxis required for most patients, encourage ambulation  Caprini Score 3 - 6:  At Risk, pharmacologic VTE prophylaxis is indicated for most patients (in the absence of a contraindication)  Caprini Score Greater than or = 7:  High Risk, pharmacologic VTE prophylaxis is indicated for most patients (in the absence of a contraindication)    Caprini score indicates that the patient is high risk for VTE event ( score 6 or greater). Surgical patient's in this group will benefit from both pharmacologic prophylaxis and intermittent compression devices . Surgical team will determine the balance between VTE  risk and bleeding risk and other clinical considerations

## 2020-10-27 NOTE — H&P PST ADULT - NSICDXPROBLEM_GEN_ALL_CORE_FT
PROBLEM DIAGNOSES  Problem: Unilateral primary osteoarthritis, left hip  Assessment and Plan: left total hip replacement     Problem: Preop examination  Assessment and Plan: preop exam

## 2020-11-05 RX ORDER — BENZOCAINE AND MENTHOL 5; 1 G/100ML; G/100ML
1 LIQUID ORAL EVERY 4 HOURS
Refills: 0 | Status: DISCONTINUED | OUTPATIENT
Start: 2020-11-09 | End: 2020-11-10

## 2020-11-05 RX ORDER — PANTOPRAZOLE SODIUM 20 MG/1
40 TABLET, DELAYED RELEASE ORAL
Refills: 0 | Status: DISCONTINUED | OUTPATIENT
Start: 2020-11-09 | End: 2020-11-10

## 2020-11-05 RX ORDER — MORPHINE SULFATE 50 MG/1
4 CAPSULE, EXTENDED RELEASE ORAL ONCE
Refills: 0 | Status: DISCONTINUED | OUTPATIENT
Start: 2020-11-09 | End: 2020-11-10

## 2020-11-05 RX ORDER — OXYCODONE HYDROCHLORIDE 5 MG/1
10 TABLET ORAL EVERY 4 HOURS
Refills: 0 | Status: DISCONTINUED | OUTPATIENT
Start: 2020-11-09 | End: 2020-11-10

## 2020-11-05 RX ORDER — CARVEDILOL PHOSPHATE 80 MG/1
25 CAPSULE, EXTENDED RELEASE ORAL EVERY 12 HOURS
Refills: 0 | Status: DISCONTINUED | OUTPATIENT
Start: 2020-11-09 | End: 2020-11-10

## 2020-11-05 RX ORDER — SENNA PLUS 8.6 MG/1
2 TABLET ORAL AT BEDTIME
Refills: 0 | Status: DISCONTINUED | OUTPATIENT
Start: 2020-11-09 | End: 2020-11-10

## 2020-11-05 RX ORDER — MAGNESIUM HYDROXIDE 400 MG/1
30 TABLET, CHEWABLE ORAL DAILY
Refills: 0 | Status: DISCONTINUED | OUTPATIENT
Start: 2020-11-09 | End: 2020-11-10

## 2020-11-05 RX ORDER — SACUBITRIL AND VALSARTAN 24; 26 MG/1; MG/1
1 TABLET, FILM COATED ORAL
Refills: 0 | Status: DISCONTINUED | OUTPATIENT
Start: 2020-11-09 | End: 2020-11-10

## 2020-11-05 RX ORDER — ONDANSETRON 8 MG/1
4 TABLET, FILM COATED ORAL EVERY 6 HOURS
Refills: 0 | Status: DISCONTINUED | OUTPATIENT
Start: 2020-11-09 | End: 2020-11-10

## 2020-11-05 RX ORDER — ASCORBIC ACID 60 MG
500 TABLET,CHEWABLE ORAL
Refills: 0 | Status: DISCONTINUED | OUTPATIENT
Start: 2020-11-09 | End: 2020-11-10

## 2020-11-05 RX ORDER — OXYCODONE HYDROCHLORIDE 5 MG/1
5 TABLET ORAL EVERY 4 HOURS
Refills: 0 | Status: DISCONTINUED | OUTPATIENT
Start: 2020-11-09 | End: 2020-11-10

## 2020-11-05 RX ORDER — LANOLIN ALCOHOL/MO/W.PET/CERES
3 CREAM (GRAM) TOPICAL AT BEDTIME
Refills: 0 | Status: DISCONTINUED | OUTPATIENT
Start: 2020-11-09 | End: 2020-11-10

## 2020-11-05 RX ORDER — ASPIRIN/CALCIUM CARB/MAGNESIUM 324 MG
81 TABLET ORAL
Refills: 0 | Status: DISCONTINUED | OUTPATIENT
Start: 2020-11-10 | End: 2020-11-10

## 2020-11-05 RX ORDER — POLYETHYLENE GLYCOL 3350 17 G/17G
17 POWDER, FOR SOLUTION ORAL DAILY
Refills: 0 | Status: DISCONTINUED | OUTPATIENT
Start: 2020-11-09 | End: 2020-11-10

## 2020-11-05 RX ORDER — ACETAMINOPHEN 500 MG
650 TABLET ORAL EVERY 6 HOURS
Refills: 0 | Status: DISCONTINUED | OUTPATIENT
Start: 2020-11-09 | End: 2020-11-10

## 2020-11-05 RX ORDER — ISOSORBIDE MONONITRATE 60 MG/1
60 TABLET, EXTENDED RELEASE ORAL DAILY
Refills: 0 | Status: DISCONTINUED | OUTPATIENT
Start: 2020-11-09 | End: 2020-11-10

## 2020-11-06 ENCOUNTER — APPOINTMENT (OUTPATIENT)
Dept: DISASTER EMERGENCY | Facility: CLINIC | Age: 65
End: 2020-11-06

## 2020-11-06 DIAGNOSIS — Z01.818 ENCOUNTER FOR OTHER PREPROCEDURAL EXAMINATION: ICD-10-CM

## 2020-11-06 PROBLEM — Z00.00 ENCOUNTER FOR PREVENTIVE HEALTH EXAMINATION: Status: ACTIVE | Noted: 2020-11-06

## 2020-11-08 ENCOUNTER — TRANSCRIPTION ENCOUNTER (OUTPATIENT)
Age: 65
End: 2020-11-08

## 2020-11-08 LAB — SARS-COV-2 N GENE NPH QL NAA+PROBE: NOT DETECTED

## 2020-11-09 ENCOUNTER — RESULT REVIEW (OUTPATIENT)
Age: 65
End: 2020-11-09

## 2020-11-09 ENCOUNTER — TRANSCRIPTION ENCOUNTER (OUTPATIENT)
Age: 65
End: 2020-11-09

## 2020-11-09 ENCOUNTER — INPATIENT (INPATIENT)
Facility: HOSPITAL | Age: 65
LOS: 0 days | Discharge: HOME HEALTH | End: 2020-11-10
Attending: ORTHOPAEDIC SURGERY | Admitting: ORTHOPAEDIC SURGERY
Payer: COMMERCIAL

## 2020-11-09 VITALS
OXYGEN SATURATION: 98 % | SYSTOLIC BLOOD PRESSURE: 138 MMHG | RESPIRATION RATE: 17 BRPM | DIASTOLIC BLOOD PRESSURE: 70 MMHG | WEIGHT: 179.9 LBS | HEIGHT: 68 IN | TEMPERATURE: 98 F | HEART RATE: 85 BPM

## 2020-11-09 DIAGNOSIS — Z95.5 PRESENCE OF CORONARY ANGIOPLASTY IMPLANT AND GRAFT: Chronic | ICD-10-CM

## 2020-11-09 LAB
BLD GP AB SCN SERPL QL: SIGNIFICANT CHANGE UP
HCT VFR BLD CALC: 36.2 % — LOW (ref 39–50)
HGB BLD-MCNC: 11.8 G/DL — LOW (ref 13–17)
MCHC RBC-ENTMCNC: 27.4 PG — SIGNIFICANT CHANGE UP (ref 27–34)
MCHC RBC-ENTMCNC: 32.6 GM/DL — SIGNIFICANT CHANGE UP (ref 32–36)
MCV RBC AUTO: 84 FL — SIGNIFICANT CHANGE UP (ref 80–100)
NRBC # BLD: 0 /100 WBCS — SIGNIFICANT CHANGE UP (ref 0–0)
PLATELET # BLD AUTO: 185 K/UL — SIGNIFICANT CHANGE UP (ref 150–400)
RBC # BLD: 4.31 M/UL — SIGNIFICANT CHANGE UP (ref 4.2–5.8)
RBC # FLD: 13.3 % — SIGNIFICANT CHANGE UP (ref 10.3–14.5)
WBC # BLD: 6.93 K/UL — SIGNIFICANT CHANGE UP (ref 3.8–10.5)
WBC # FLD AUTO: 6.93 K/UL — SIGNIFICANT CHANGE UP (ref 3.8–10.5)

## 2020-11-09 PROCEDURE — 88305 TISSUE EXAM BY PATHOLOGIST: CPT | Mod: 26

## 2020-11-09 PROCEDURE — 88311 DECALCIFY TISSUE: CPT | Mod: 26

## 2020-11-09 RX ORDER — ACETAMINOPHEN 500 MG
650 TABLET ORAL ONCE
Refills: 0 | Status: COMPLETED | OUTPATIENT
Start: 2020-11-09 | End: 2020-11-09

## 2020-11-09 RX ORDER — HYDROMORPHONE HYDROCHLORIDE 2 MG/ML
0.5 INJECTION INTRAMUSCULAR; INTRAVENOUS; SUBCUTANEOUS
Refills: 0 | Status: DISCONTINUED | OUTPATIENT
Start: 2020-11-09 | End: 2020-11-09

## 2020-11-09 RX ORDER — HYDROMORPHONE HYDROCHLORIDE 2 MG/ML
1 INJECTION INTRAMUSCULAR; INTRAVENOUS; SUBCUTANEOUS
Refills: 0 | Status: DISCONTINUED | OUTPATIENT
Start: 2020-11-09 | End: 2020-11-09

## 2020-11-09 RX ORDER — SODIUM CHLORIDE 9 MG/ML
3 INJECTION INTRAMUSCULAR; INTRAVENOUS; SUBCUTANEOUS EVERY 8 HOURS
Refills: 0 | Status: DISCONTINUED | OUTPATIENT
Start: 2020-11-09 | End: 2020-11-09

## 2020-11-09 RX ORDER — CEFAZOLIN SODIUM 1 G
2000 VIAL (EA) INJECTION EVERY 8 HOURS
Refills: 0 | Status: COMPLETED | OUTPATIENT
Start: 2020-11-09 | End: 2020-11-10

## 2020-11-09 RX ORDER — DEXAMETHASONE 0.5 MG/5ML
10 ELIXIR ORAL ONCE
Refills: 0 | Status: COMPLETED | OUTPATIENT
Start: 2020-11-10 | End: 2020-11-10

## 2020-11-09 RX ORDER — SODIUM CHLORIDE 9 MG/ML
1000 INJECTION, SOLUTION INTRAVENOUS
Refills: 0 | Status: DISCONTINUED | OUTPATIENT
Start: 2020-11-09 | End: 2020-11-10

## 2020-11-09 RX ORDER — ONDANSETRON 8 MG/1
4 TABLET, FILM COATED ORAL ONCE
Refills: 0 | Status: DISCONTINUED | OUTPATIENT
Start: 2020-11-09 | End: 2020-11-09

## 2020-11-09 RX ORDER — SODIUM CHLORIDE 9 MG/ML
1000 INJECTION, SOLUTION INTRAVENOUS
Refills: 0 | Status: DISCONTINUED | OUTPATIENT
Start: 2020-11-09 | End: 2020-11-09

## 2020-11-09 RX ORDER — HYDRALAZINE HCL 50 MG
25 TABLET ORAL
Refills: 0 | Status: DISCONTINUED | OUTPATIENT
Start: 2020-11-09 | End: 2020-11-10

## 2020-11-09 RX ORDER — TRANEXAMIC ACID 100 MG/ML
1000 INJECTION, SOLUTION INTRAVENOUS ONCE
Refills: 0 | Status: COMPLETED | OUTPATIENT
Start: 2020-11-09 | End: 2020-11-09

## 2020-11-09 RX ADMIN — TRANEXAMIC ACID 220 MILLIGRAM(S): 100 INJECTION, SOLUTION INTRAVENOUS at 16:40

## 2020-11-09 RX ADMIN — Medication 650 MILLIGRAM(S): at 11:11

## 2020-11-09 RX ADMIN — SODIUM CHLORIDE 50 MILLILITER(S): 9 INJECTION, SOLUTION INTRAVENOUS at 16:15

## 2020-11-09 RX ADMIN — SACUBITRIL AND VALSARTAN 1 TABLET(S): 24; 26 TABLET, FILM COATED ORAL at 22:08

## 2020-11-09 RX ADMIN — SODIUM CHLORIDE 100 MILLILITER(S): 9 INJECTION, SOLUTION INTRAVENOUS at 20:33

## 2020-11-09 RX ADMIN — CARVEDILOL PHOSPHATE 25 MILLIGRAM(S): 80 CAPSULE, EXTENDED RELEASE ORAL at 22:08

## 2020-11-09 RX ADMIN — OXYCODONE HYDROCHLORIDE 10 MILLIGRAM(S): 5 TABLET ORAL at 21:30

## 2020-11-09 RX ADMIN — OXYCODONE HYDROCHLORIDE 10 MILLIGRAM(S): 5 TABLET ORAL at 20:30

## 2020-11-09 RX ADMIN — Medication 100 MILLIGRAM(S): at 21:48

## 2020-11-09 NOTE — CONSULT NOTE ADULT - SUBJECTIVE AND OBJECTIVE BOX
RADHA LYNN is a 65y Male s/p LEFT TOTAL HIP REPLACEMENT      w/ h/o Diastolic heart failure    Systolic heart failure    Ischemic cardiomyopathy    CAD (coronary artery disease)    Hyperlipidemia    HTN (hypertension)      denies any chest pain shortness of breath palpitation dizziness lightheadedness nausea vomiting fever or chills    Coronary stent patent    No significant past surgical history      Hypertension (Father)      SH: doesnot smoke or drink at this time    No Known Allergies    acetaminophen   Tablet .. 650 milliGRAM(s) Oral every 6 hours  aluminum hydroxide/magnesium hydroxide/simethicone Suspension 30 milliLiter(s) Oral four times a day PRN  ascorbic acid 500 milliGRAM(s) Oral two times a day  benzocaine 15 mG/menthol 3.6 mG (Sugar-Free) Lozenge 1 Lozenge Oral every 4 hours PRN  carvedilol 25 milliGRAM(s) Oral every 12 hours  ceFAZolin   IVPB 2000 milliGRAM(s) IV Intermittent every 8 hours  hydrALAZINE 25 milliGRAM(s) Oral two times a day  isosorbide   mononitrate ER Tablet (IMDUR) 60 milliGRAM(s) Oral daily  lactated ringers. 1000 milliLiter(s) IV Continuous <Continuous>  magnesium hydroxide Suspension 30 milliLiter(s) Oral daily PRN  melatonin 3 milliGRAM(s) Oral at bedtime PRN  morphine  - Injectable 4 milliGRAM(s) IV Push once  multivitamin 1 Tablet(s) Oral daily  ondansetron Injectable 4 milliGRAM(s) IV Push every 6 hours PRN  oxyCODONE    IR 5 milliGRAM(s) Oral every 4 hours PRN  oxyCODONE    IR 10 milliGRAM(s) Oral every 4 hours PRN  pantoprazole    Tablet 40 milliGRAM(s) Oral before breakfast  polyethylene glycol 3350 17 Gram(s) Oral daily  sacubitril 97 mG/valsartan 103 mG 1 Tablet(s) Oral two times a day  senna 2 Tablet(s) Oral at bedtime PRN    T(C): 36.6 (11-09-20 @ 18:10), Max: 36.6 (11-09-20 @ 10:47)  HR: 87 (11-09-20 @ 18:10) (76 - 87)  BP: 135/74 (11-09-20 @ 18:10) (119/78 - 148/90)  RR: 18 (11-09-20 @ 18:10) (15 - 19)  SpO2: 98% (11-09-20 @ 18:10) (97% - 100%)  HEENT unremarkable  neck no JVD or bruit  heart normal S1 S2 RRR no gallops or rubs  chest clear to auscultation  abd sof nontender non distended +bs  ext no calf tenderness    A/P   DVT PX  pain control  bowel regimen   wound care as per ortho  GI PX  antiemetics prn  incentive spirometer

## 2020-11-09 NOTE — PHYSICAL THERAPY INITIAL EVALUATION ADULT - ADDITIONAL COMMENTS
Pt lives with family in a bldg apt, no steps, +elevator access. Pt ambulates with cane and R prosthesis. Pt was independent with mobility and transfer.

## 2020-11-09 NOTE — PHYSICAL THERAPY INITIAL EVALUATION ADULT - CRITERIA FOR SKILLED THERAPEUTIC INTERVENTIONS
risk reduction/prevention/rehab potential/impairments found/therapy frequency/anticipated equipment needs at discharge/functional limitations in following categories/anticipated discharge recommendation/predicted duration of therapy intervention

## 2020-11-09 NOTE — PHYSICAL THERAPY INITIAL EVALUATION ADULT - BED MOBILITY TRAINING, PT EVAL
Pt will independently perform all aspects of bed mobility to help prevent pressure ulcers, by 2 weeks.

## 2020-11-09 NOTE — PHYSICAL THERAPY INITIAL EVALUATION ADULT - BALANCE TRAINING, PT EVAL
Pt will be independent in sitting, transfers, standing and ambulation with good balance using appropriate assistive device and prevent falls in 2 weeks.

## 2020-11-09 NOTE — DISCHARGE NOTE PROVIDER - NSDCACTIVITY_GEN_ALL_CORE
No heavy lifting/straining/Do not drive or operate machinery/Stairs allowed/Walking - Indoors allowed/Walking - Outdoors allowed/Showering allowed

## 2020-11-09 NOTE — PROGRESS NOTE ADULT - SUBJECTIVE AND OBJECTIVE BOX
Post-op Check   POD#0 S/P Left ALICIA   65yMale Patient seen and examined, Pain controlled  Patient Denies SOB, CP, N/V/D       PE: Left Hip/LE: Dressing C/D/I, Sensation/motor intact, DP 2+, FROM ankle/toes   B/L LE: Skin intact. Calf: soft, compressible and nontender. DP/PT 2+ NVI.                               11.8   6.93  )-----------( 185      ( 09 Nov 2020 16:41 )             36.2                 A: As above   P: Pain Control       DVT Prophylaxis      Incentive spirometry      Total hip precautions Reviewed       PT WBAT LLE      Isometric exercises      Discharge Planning      All the above discussed and understood by pt       Ortho to F/U

## 2020-11-09 NOTE — DISCHARGE NOTE PROVIDER - NSDCFUADDAPPT_GEN_ALL_CORE_FT
Follow up with your surgeon in two weeks. Call for appointment.  If you need more pain medication, call your surgeon's office.  We Recommend a follow up appointment with your primary care physician for repeat blood work (CBC and BMP) for post hospital discharge follow-up care.  Call your surgeon if you have increased redness/pain/drainage or fever. Return to ER for shortness of breath/calf tenderness. Follow up with your surgeon in two weeks. Call for appointment.  If you need more pain medication, call your surgeon's office. For medication refills or authorizations, please call [phone]    We recommend that you call and schedule a follow up appointment within 2-4 weeks with your primary care physician for repeat blood work (CBC and BMP) for post hospital discharge follow-up care.    Call your surgeon if you have increased redness/pain/drainage or fever. Return to ER for shortness of breath/calf tenderness.

## 2020-11-09 NOTE — PHYSICAL THERAPY INITIAL EVALUATION ADULT - RANGE OF MOTION EXAMINATION, REHAB EVAL
bilateral upper extremity ROM was WFL (within functional limits)/bilateral lower extremity ROM was WFL (within functional limits)/R LE limited due to deformity; L LE WFL (hip precautions maintained)

## 2020-11-09 NOTE — DISCHARGE NOTE PROVIDER - NSDCMRMEDTOKEN_GEN_ALL_CORE_FT
aspirin 81 mg oral tablet, chewable: 1 tab(s) orally once a day  carvedilol 25 mg oral tablet: 1 tab(s) orally 2 times a day  Entresto 97 mg-103 mg oral tablet: orally 2 times a day  hydrALAZINE 25 mg oral tablet: orally 2 times a day  isosorbide mononitrate 60 mg oral tablet, extended release: 1 tab(s) orally once a day (in the morning)  Vitamin D2 50,000 intl units (1.25 mg) oral capsule: 1 cap(s) orally once a week on Saturday    acetaminophen 325 mg oral tablet: 2 tab(s) orally every 6 hours, As Needed  ascorbic acid 500 mg oral tablet: 1 tab(s) orally 2 times a day  aspirin 81 mg oral delayed release tablet: 1 tab(s) orally 2 times a day x 30 days MDD:2  carvedilol 25 mg oral tablet: 1 tab(s) orally 2 times a day  Entresto 97 mg-103 mg oral tablet: orally 2 times a day  hydrALAZINE 25 mg oral tablet: orally 2 times a day  isosorbide mononitrate 60 mg oral tablet, extended release: 1 tab(s) orally once a day (in the morning)  Multiple Vitamins oral tablet: 1 tab(s) orally once a day  oxyCODONE 5 mg oral tablet: 1-2 tab(s) orally every 4 hours, As needed,for pain MDD:10  pantoprazole 40 mg oral delayed release tablet: 1 tab(s) orally once a day (before a meal) MDD:1  polyethylene glycol 3350 oral powder for reconstitution: 17 gram(s) orally once a day  senna oral tablet: 2 tab(s) orally once a day (at bedtime), As needed, Constipation  Vitamin D2 50,000 intl units (1.25 mg) oral capsule: 1 cap(s) orally once a week on Saturday

## 2020-11-09 NOTE — DISCHARGE NOTE PROVIDER - HOSPITAL COURSE
65yMale with history of Left Hip Osteoarthritis presenting for Left LAICIA by Dr. Crane on 11/9/20. Risk and benefits of surgery were explained to the patient. The patient understood and agreed to proceed with surgery. Patient underwent the procedure with no intraoperative complications. Pt was brought in stable condition to the PACU. Once stable in PACU, pt was brought to the floor. During hospital stay pt was followed by Medicine during this admission. Pt had an uneventful hospital course. Pt is stable for discharge to [rehab/home] on POD#[ ] 65yMale with history of Left Hip Osteoarthritis presenting for Left ALICIA by Dr. rCane on 11/9/20. Risk and benefits of surgery were explained to the patient. The patient understood and agreed to proceed with surgery. Patient underwent the procedure with no intraoperative complications. Pt was brought in stable condition to the PACU. Once stable in PACU, pt was brought to the floor. During hospital stay pt was followed by Medicine during this admission. Pt had an uneventful hospital course. Pt is stable for discharge to home one POD#1

## 2020-11-09 NOTE — PHYSICAL THERAPY INITIAL EVALUATION ADULT - TRANSFER TRAINING, PT EVAL
Pt will independently perform sit to/from stand transfers without LOB using rolling walker by 2-4 weeks.

## 2020-11-09 NOTE — DISCHARGE NOTE PROVIDER - NSDCFUADDINST_GEN_ALL_CORE_FT
Keep SHARDA Dressing Clean, Dry and Intact. May shower with SHARDA Dressing. Please do not scrub, soak, peel or pick at the SHARDA dressing. No creams, lotions, or oils over dressing. May shower and let water run over dressing, no baths. Pat dry once out of shower. Dressing to be removed in 7 days. If dressing is saturated from border to border - may remove and replace with clean dry dressing Hip replacement precautions: Abduction pillow. Elevate the leg (while keeping hip precautions) as often as possible to help control swelling. Incentive spirometer 10X/hr.     Keep SHARDA Dressing Clean, Dry and Intact. May shower with SHARDA Dressing. Please do not scrub, soak, peel or pick at the SHARDA dressing. No creams, lotions, or oils over dressing. May shower and let water run over dressing, no baths. Pat dry once out of shower. Dressing to be removed in 7 days. If dressing is saturated from border to border - may remove and replace with clean dry dressing

## 2020-11-09 NOTE — DISCHARGE NOTE PROVIDER - CARE PROVIDER_API CALL
Jeff Crane  ORTHOPAEDIC SURGERY  [address] [place] [address]  Phone: [phone]  Fax: [phone]  Follow Up Time:

## 2020-11-09 NOTE — PHYSICAL THERAPY INITIAL EVALUATION ADULT - PERTINENT HX OF CURRENT PROBLEM, REHAB EVAL
Pt is a 65-y/o, male with c/o severe L hip pain s/p L total hip arthroplasty. Pt has a R LE deformity and wears a R LE prosthesis for the leg deficiency.

## 2020-11-10 ENCOUNTER — TRANSCRIPTION ENCOUNTER (OUTPATIENT)
Age: 65
End: 2020-11-10

## 2020-11-10 VITALS — OXYGEN SATURATION: 96 % | SYSTOLIC BLOOD PRESSURE: 124 MMHG | HEART RATE: 91 BPM | DIASTOLIC BLOOD PRESSURE: 70 MMHG

## 2020-11-10 LAB
ANION GAP SERPL CALC-SCNC: 6 MMOL/L — SIGNIFICANT CHANGE UP (ref 5–17)
BUN SERPL-MCNC: 13 MG/DL — SIGNIFICANT CHANGE UP (ref 7–23)
CALCIUM SERPL-MCNC: 8.4 MG/DL — LOW (ref 8.5–10.1)
CHLORIDE SERPL-SCNC: 105 MMOL/L — SIGNIFICANT CHANGE UP (ref 96–108)
CO2 SERPL-SCNC: 26 MMOL/L — SIGNIFICANT CHANGE UP (ref 22–31)
CREAT SERPL-MCNC: 1.05 MG/DL — SIGNIFICANT CHANGE UP (ref 0.5–1.3)
GLUCOSE SERPL-MCNC: 117 MG/DL — HIGH (ref 70–99)
HCT VFR BLD CALC: 32.8 % — LOW (ref 39–50)
HGB BLD-MCNC: 10.3 G/DL — LOW (ref 13–17)
MCHC RBC-ENTMCNC: 26.8 PG — LOW (ref 27–34)
MCHC RBC-ENTMCNC: 31.4 GM/DL — LOW (ref 32–36)
MCV RBC AUTO: 85.4 FL — SIGNIFICANT CHANGE UP (ref 80–100)
NRBC # BLD: 0 /100 WBCS — SIGNIFICANT CHANGE UP (ref 0–0)
PLATELET # BLD AUTO: 184 K/UL — SIGNIFICANT CHANGE UP (ref 150–400)
POTASSIUM SERPL-MCNC: 3.6 MMOL/L — SIGNIFICANT CHANGE UP (ref 3.5–5.3)
POTASSIUM SERPL-SCNC: 3.6 MMOL/L — SIGNIFICANT CHANGE UP (ref 3.5–5.3)
RBC # BLD: 3.84 M/UL — LOW (ref 4.2–5.8)
RBC # FLD: 13.6 % — SIGNIFICANT CHANGE UP (ref 10.3–14.5)
SODIUM SERPL-SCNC: 137 MMOL/L — SIGNIFICANT CHANGE UP (ref 135–145)
WBC # BLD: 9.6 K/UL — SIGNIFICANT CHANGE UP (ref 3.8–10.5)
WBC # FLD AUTO: 9.6 K/UL — SIGNIFICANT CHANGE UP (ref 3.8–10.5)

## 2020-11-10 PROCEDURE — 73501 X-RAY EXAM HIP UNI 1 VIEW: CPT | Mod: 26

## 2020-11-10 RX ORDER — SODIUM CHLORIDE 9 MG/ML
1000 INJECTION INTRAMUSCULAR; INTRAVENOUS; SUBCUTANEOUS ONCE
Refills: 0 | Status: COMPLETED | OUTPATIENT
Start: 2020-11-10 | End: 2020-11-10

## 2020-11-10 RX ORDER — ASCORBIC ACID 60 MG
1 TABLET,CHEWABLE ORAL
Qty: 0 | Refills: 0 | DISCHARGE
Start: 2020-11-10

## 2020-11-10 RX ORDER — OXYCODONE HYDROCHLORIDE 5 MG/1
1 TABLET ORAL
Qty: 42 | Refills: 0
Start: 2020-11-10 | End: 2020-11-16

## 2020-11-10 RX ORDER — ASPIRIN/CALCIUM CARB/MAGNESIUM 324 MG
1 TABLET ORAL
Qty: 60 | Refills: 0
Start: 2020-11-10 | End: 2020-12-09

## 2020-11-10 RX ORDER — PANTOPRAZOLE SODIUM 20 MG/1
1 TABLET, DELAYED RELEASE ORAL
Qty: 30 | Refills: 0
Start: 2020-11-10 | End: 2020-12-09

## 2020-11-10 RX ORDER — POLYETHYLENE GLYCOL 3350 17 G/17G
17 POWDER, FOR SOLUTION ORAL
Qty: 0 | Refills: 0 | DISCHARGE
Start: 2020-11-10

## 2020-11-10 RX ORDER — SENNA PLUS 8.6 MG/1
2 TABLET ORAL
Qty: 0 | Refills: 0 | DISCHARGE
Start: 2020-11-10

## 2020-11-10 RX ORDER — ACETAMINOPHEN 500 MG
2 TABLET ORAL
Qty: 0 | Refills: 0 | DISCHARGE
Start: 2020-11-10

## 2020-11-10 RX ORDER — ASPIRIN/CALCIUM CARB/MAGNESIUM 324 MG
1 TABLET ORAL
Qty: 0 | Refills: 0 | DISCHARGE

## 2020-11-10 RX ADMIN — ISOSORBIDE MONONITRATE 60 MILLIGRAM(S): 60 TABLET, EXTENDED RELEASE ORAL at 13:40

## 2020-11-10 RX ADMIN — SACUBITRIL AND VALSARTAN 1 TABLET(S): 24; 26 TABLET, FILM COATED ORAL at 13:40

## 2020-11-10 RX ADMIN — Medication 500 MILLIGRAM(S): at 05:42

## 2020-11-10 RX ADMIN — Medication 650 MILLIGRAM(S): at 05:42

## 2020-11-10 RX ADMIN — CARVEDILOL PHOSPHATE 25 MILLIGRAM(S): 80 CAPSULE, EXTENDED RELEASE ORAL at 09:31

## 2020-11-10 RX ADMIN — Medication 1 TABLET(S): at 13:40

## 2020-11-10 RX ADMIN — Medication 81 MILLIGRAM(S): at 05:42

## 2020-11-10 RX ADMIN — SODIUM CHLORIDE 500 MILLILITER(S): 9 INJECTION INTRAMUSCULAR; INTRAVENOUS; SUBCUTANEOUS at 06:56

## 2020-11-10 RX ADMIN — Medication 650 MILLIGRAM(S): at 01:17

## 2020-11-10 RX ADMIN — Medication 650 MILLIGRAM(S): at 13:40

## 2020-11-10 RX ADMIN — OXYCODONE HYDROCHLORIDE 10 MILLIGRAM(S): 5 TABLET ORAL at 10:27

## 2020-11-10 RX ADMIN — PANTOPRAZOLE SODIUM 40 MILLIGRAM(S): 20 TABLET, DELAYED RELEASE ORAL at 09:26

## 2020-11-10 RX ADMIN — OXYCODONE HYDROCHLORIDE 10 MILLIGRAM(S): 5 TABLET ORAL at 09:27

## 2020-11-10 RX ADMIN — Medication 650 MILLIGRAM(S): at 07:05

## 2020-11-10 RX ADMIN — Medication 102 MILLIGRAM(S): at 05:42

## 2020-11-10 RX ADMIN — Medication 100 MILLIGRAM(S): at 05:42

## 2020-11-10 RX ADMIN — Medication 650 MILLIGRAM(S): at 00:17

## 2020-11-10 NOTE — OCCUPATIONAL THERAPY INITIAL EVALUATION ADULT - TRANSFER SAFETY CONCERNS NOTED: SIT/STAND, REHAB EVAL
decreased step length/decreased sequencing ability/inability to maintain weight-bearing restrictions w/o assist/decreased weight-shifting ability/decreased balance during turns

## 2020-11-10 NOTE — OCCUPATIONAL THERAPY INITIAL EVALUATION ADULT - ADDITIONAL COMMENTS
Patient lives with family (Wife can assist prior to work and post work. Pts kids can assist as well) in an apartment with no steps to enter. Once inside, the patient has an elevator that takes patient to main floor where apartment is. The patient bathroom has a tub/shower combination, fixed/retractable shower head, comfort height toilet and grab bars +. Patient reports having 3/1 commode at home. The patient ambulates with a cane and owns a rolling walker. The patient is R handed, drives and wears glasses all the time.

## 2020-11-10 NOTE — PROGRESS NOTE ADULT - SUBJECTIVE AND OBJECTIVE BOX
Patient is s/p L ALICIA under spinal anesthesia POD#1  Pt tolerated procedure well without any intra-op complications.   Pt doing well at this time. Pain is controlled.   Denies CP/SOB/Dizziness/N/V/D/HA.     Vital Signs Last 24 Hrs  T(C): 36.7 (10 Nov 2020 09:30), Max: 36.9 (09 Nov 2020 21:26)  T(F): 98.1 (10 Nov 2020 09:30), Max: 98.4 (09 Nov 2020 21:26)  HR: 91 (10 Nov 2020 11:24) (76 - 104)  BP: 124/70 (10 Nov 2020 11:24) (103/64 - 148/90)  BP(mean): --  RR: 17 (10 Nov 2020 09:30) (15 - 19)  SpO2: 96% (10 Nov 2020 11:24) (96% - 100%)    PE:  General: A&Ox3 NAD  LLE: Adriane Dressing C/D/I. abduction pillow in place. Motor intact + EHL/FHL/TA/GS. Sensation is grossly intact. Extremity warm. Compartments soft, compressible, no calf tenderness. DP 2+   RLE: Motor intact + EHL/FHL/TA/GS. Sensation is grossly intact. Extremity warm. Compartments soft, compressible, no calf tenderness. DP 2+     Labs:                          10.3   9.60  )-----------( 184      ( 10 Nov 2020 07:56 )             32.8       11-10    137  |  105  |  13  ----------------------------<  117<H>  3.6   |  26  |  1.05    Ca    8.4<L>      10 Nov 2020 07:56        A/P: Patient is a 65y y/o Male s/p L ALICIA, POD #1  -Elevated Cr preop: Celebrex held  -wound care, isometric exercises, GI motility, new medications, hospital course reviewed with pt  -Pain control/analgesia  -Inc spirometry reviewed and counseled  -DVT ppx with venodynes and ASA 81 BID  -F/U AM Labs  -PT/OT/WBAT, Posterior hip precautions,   -Antibiotic post op completed  -Medical consult appreciated  -Discharge planning home today

## 2020-11-10 NOTE — PROGRESS NOTE ADULT - SUBJECTIVE AND OBJECTIVE BOX
RADHA LYNN is a 65y Male s/p LEFT TOTAL HIP REPLACEMENT        denies any chest pain shortness of breath palpitation dizziness lightheadedness nausea vomiting fever or chills    T(C): 36.4 (11-10-20 @ 05:30), Max: 36.9 (11-09-20 @ 21:26)  HR: 92 (11-10-20 @ 05:30) (76 - 104)  BP: 103/64 (11-10-20 @ 05:30) (103/64 - 148/90)  RR: 17 (11-10-20 @ 05:30) (15 - 19)  SpO2: 97% (11-10-20 @ 05:30) (97% - 100%)  no jvd/bruit  s1 s2 rrr  cta  s/nt/nd  no calf tend                        10.3   9.60  )-----------( 184      ( 10 Nov 2020 07:56 )             32.8   11-10    137  |  105  |  13  ----------------------------<  117<H>  3.6   |  26  |  1.05    Ca    8.4<L>      10 Nov 2020 07:56        cont dvt px  pain control  bowel regimen  antiemetics  incentive spirometer

## 2020-11-10 NOTE — OCCUPATIONAL THERAPY INITIAL EVALUATION ADULT - LEVEL OF INDEPENDENCE: DRESS LOWER BODY, OT EVAL
Mayo underwear and pants with CGA; Patient deferred doff/mayo socks and shoes stating that his family will help him when he is home.

## 2020-11-10 NOTE — OCCUPATIONAL THERAPY INITIAL EVALUATION ADULT - GENERAL OBSERVATIONS, REHAB EVAL
Pt was encountered semi-supine in bed; NAD, SHARDA +, L ALICIA posterior approach POD 1, posterior hip precautions, LLE WBAT, L hip dressing clean, dry and intact AXOX4, cooperative, followed commands; pt c/o pain in L hip which impacts pt performance with functional ADL's/transfers and mobility.

## 2020-11-10 NOTE — DISCHARGE NOTE NURSING/CASE MANAGEMENT/SOCIAL WORK - NSDCPNINST_GEN_ALL_CORE
Take your medications exactly as prescribed. Having your pain under control will help increase activity, improve deep breathing and coughing and prevent complications like pneumonia and blood clots in your legs. Some of the common side effects of pain medications are nausea, vomiting, itching, rash and upset stomach. Contact your doctor if you develop these or any other unusual symptoms.   Eat a diet rich in fiber and drink plenty of oral fluids.   Use other pain management methods like, cold and warm applications, elevation of affected body part, listening to music, watching TV, yoga, etc.   Do not take any other medications unless approved by your doctor.   Do not drive, operate machinery, drink alcohol, or make any important decisions while taking narcotic pain medications.   Store medication in its original bottle, in a locked cabinet away from the reach of children.   Dispose of any unused and  medication safely.   Constipation, Nausea and Dizziness  as the top 3 narcotic side effects.

## 2020-11-10 NOTE — OCCUPATIONAL THERAPY INITIAL EVALUATION ADULT - TRANSFER SAFETY CONCERNS NOTED: TOILET, REHAB EVAL
inability to maintain weight-bearing restrictions w/o assist/decreased sequencing ability/decreased step length/decreased weight-shifting ability/decreased balance during turns

## 2020-11-10 NOTE — OCCUPATIONAL THERAPY INITIAL EVALUATION ADULT - PRECAUTIONS/LIMITATIONS, REHAB EVAL
left hip precautions/Posterior hip precautions including no bending of the hip beyond 90 degrees, nio hip adduction beyond midline (no crossing of the legs) and no hip internal rotation beyond neutral.

## 2020-11-10 NOTE — OCCUPATIONAL THERAPY INITIAL EVALUATION ADULT - RANGE OF MOTION, PT EVAL
Pt will have WFL L Hip ROM (within posterior approach precautions) in order to perform functional tasks/transfers independently within 4 weeks.

## 2020-11-10 NOTE — DISCHARGE NOTE NURSING/CASE MANAGEMENT/SOCIAL WORK - PATIENT PORTAL LINK FT
You can access the FollowMyHealth Patient Portal offered by [facility] by registering at the following website: [email or web address]. By joining [employer]’s FollowMyHealth portal, you will also be able to view your health information using other applications (apps) compatible with our system.

## 2020-11-12 LAB — SURGICAL PATHOLOGY STUDY: SIGNIFICANT CHANGE UP

## 2020-11-17 DIAGNOSIS — E78.5 HYPERLIPIDEMIA, UNSPECIFIED: ICD-10-CM

## 2020-11-17 DIAGNOSIS — I50.20 UNSPECIFIED SYSTOLIC (CONGESTIVE) HEART FAILURE: ICD-10-CM

## 2020-11-17 DIAGNOSIS — I25.5 ISCHEMIC CARDIOMYOPATHY: ICD-10-CM

## 2020-11-17 DIAGNOSIS — M16.12 UNILATERAL PRIMARY OSTEOARTHRITIS, LEFT HIP: ICD-10-CM

## 2020-11-17 DIAGNOSIS — I11.0 HYPERTENSIVE HEART DISEASE WITH HEART FAILURE: ICD-10-CM

## 2021-09-17 NOTE — ED PROVIDER NOTE - ATTENDING CONTRIBUTION TO CARE
64 years old male from the presx test for right hip replacement due to arthritis c/o generalized weakness after took his Hydrochlorothiazide and Metoprolol due to elevated bp at the pre sx testing. Pt now is alert and oriented x 3 sts he is feeling much better denies headache, dizziness, blurred visions, light sensitivities, neck/back pain, focal/distal weakness or numbness, sob, chest pain, palpitations, nausea, vomiting, fever, chills, abd pain. Pt is speaking in clear full sentences smiling pleasant while sitting up at the edge of the stretcher in a bright light vicky, S1/S2 regular, breath sounds are clear equal bilaterally, abd is soft nontender to palp, No focal neuro deficits on exam. bp 137/90 at triage, Agree with Dr. Tony russell/treatment/dispo.
No abnormalities

## 2021-10-14 NOTE — H&P PST ADULT - ASSESSMENT
6
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 He c/o of severe pain to his left hip.  He is scheduled for a left hip replacement     CAPRINI SCORE [CLOT]    AGE RELATED RISK FACTORS                                                       MOBILITY RELATED FACTORS  [ ] Age 41-60 years                                            (1 Point)                  [ ] Bed rest                                                        (1 Point)  [ x] Age: 61-74 years                                           (2 Points)                 [ ] Plaster cast                                                   (2 Points)  [ ] Age= 75 years                                              (3 Points)                 [ ] Bed bound for more than 72 hours                 (2 Points)    DISEASE RELATED RISK FACTORS                                               GENDER SPECIFIC FACTORS  [ ] Edema in the lower extremities                       (1 Point)                  [ ] Pregnancy                                                     (1 Point)  [ ] Varicose veins                                               (1 Point)                  [ ] Post-partum < 6 weeks                                   (1 Point)             [ ] BMI > 25 Kg/m2                                            (1 Point)                  [ ] Hormonal therapy  or oral contraception          (1 Point)                 [ ] Sepsis (in the previous month)                        (1 Point)                  [ ] History of pregnancy complications                 (1 point)  [ ] Pneumonia or serious lung disease                                               [ ] Unexplained or recurrent                     (1 Point)           (in the previous month)                               (1 Point)  [ ] Abnormal pulmonary function test                     (1 Point)                 SURGERY RELATED RISK FACTORS  [ ] Acute myocardial infarction                              (1 Point)                 [ ]  Section                                             (1 Point)  x[ ] Congestive heart failure (in the previous month)  (1 Point)               [ ] Minor surgery                                                  (1 Point)   [ ] Inflammatory bowel disease                             (1 Point)                 [ ] Arthroscopic surgery                                        (2 Points)  [ ] Central venous access                                      (2 Points)                [ ] General surgery lasting more than 45 minutes   (2 Points)       [ ] Stroke (in the previous month)                          (5 Points)               [x ] Elective arthroplasty                                         (5 Points)                                                                                                                                               HEMATOLOGY RELATED FACTORS                                                 TRAUMA RELATED RISK FACTORS  [ ] Prior episodes of VTE                                     (3 Points)                [ ] Fracture of the hip, pelvis, or leg                       (5 Points)  [ ] Positive family history for VTE                         (3 Points)                 [ ] Acute spinal cord injury (in the previous month)  (5 Points)  [ ] Prothrombin 48657 A                                     (3 Points)                 [ ] Paralysis  (less than 1 month)                             (5 Points)  [ ] Factor V Leiden                                             (3 Points)                  [ ] Multiple Trauma within 1 month                        (5 Points)  [ ] Lupus anticoagulants                                     (3 Points)                                                           [ ] Anticardiolipin antibodies                               (3 Points)                                                       [ ] High homocysteine in the blood                      (3 Points)                                             [ ] Other congenital or acquired thrombophilia      (3 Points)                                                [ ] Heparin induced thrombocytopenia                  (3 Points)                                          Total Score [    8      ]    Caprini Score 0 - 2:  Low Risk, No VTE Prophylaxis required for most patients, encourage ambulation  Caprini Score 3 - 6:  At Risk, pharmacologic VTE prophylaxis is indicated for most patients (in the absence of a contraindication)  Caprini Score Greater than or = 7:  High Risk, pharmacologic VTE prophylaxis is indicated for most patients (in the absence of a contraindication)    Caprini score indicates that the patient is high risk for VTE event ( score 6 or greater). Surgical patient's in this group will benefit from both pharmacologic prophylaxis and intermittent compression devices . Surgical team will determine the balance between VTE  risk and bleeding risk and other clinical considerations

## 2022-07-28 ENCOUNTER — APPOINTMENT (OUTPATIENT)
Dept: PHYSICAL MEDICINE AND REHAB | Facility: CLINIC | Age: 67
End: 2022-07-28

## 2022-08-01 ENCOUNTER — APPOINTMENT (OUTPATIENT)
Dept: CV DIAGNOSTICS | Facility: HOSPITAL | Age: 67
End: 2022-08-01

## 2022-08-01 ENCOUNTER — OUTPATIENT (OUTPATIENT)
Dept: OUTPATIENT SERVICES | Facility: HOSPITAL | Age: 67
LOS: 1 days | End: 2022-08-01
Payer: COMMERCIAL

## 2022-08-01 DIAGNOSIS — I25.10 ATHEROSCLEROTIC HEART DISEASE OF NATIVE CORONARY ARTERY WITHOUT ANGINA PECTORIS: ICD-10-CM

## 2022-08-01 DIAGNOSIS — Z95.5 PRESENCE OF CORONARY ANGIOPLASTY IMPLANT AND GRAFT: Chronic | ICD-10-CM

## 2022-08-01 PROCEDURE — A9500: CPT

## 2022-08-01 PROCEDURE — 93018 CV STRESS TEST I&R ONLY: CPT

## 2022-08-01 PROCEDURE — 78452 HT MUSCLE IMAGE SPECT MULT: CPT | Mod: 26,MH

## 2022-08-01 PROCEDURE — 78452 HT MUSCLE IMAGE SPECT MULT: CPT | Mod: MH

## 2022-08-01 PROCEDURE — 93016 CV STRESS TEST SUPVJ ONLY: CPT

## 2022-08-01 PROCEDURE — 93017 CV STRESS TEST TRACING ONLY: CPT

## 2022-08-06 ENCOUNTER — OUTPATIENT (OUTPATIENT)
Dept: OUTPATIENT SERVICES | Facility: HOSPITAL | Age: 67
LOS: 1 days | End: 2022-08-06
Payer: COMMERCIAL

## 2022-08-06 DIAGNOSIS — Z95.5 PRESENCE OF CORONARY ANGIOPLASTY IMPLANT AND GRAFT: Chronic | ICD-10-CM

## 2022-08-06 DIAGNOSIS — Z11.52 ENCOUNTER FOR SCREENING FOR COVID-19: ICD-10-CM

## 2022-08-06 LAB — SARS-COV-2 RNA SPEC QL NAA+PROBE: DETECTED

## 2022-08-06 PROCEDURE — U0003: CPT

## 2022-08-06 PROCEDURE — U0005: CPT

## 2022-08-06 PROCEDURE — C9803: CPT

## 2022-08-21 ENCOUNTER — NON-APPOINTMENT (OUTPATIENT)
Age: 67
End: 2022-08-21

## 2022-08-25 ENCOUNTER — APPOINTMENT (OUTPATIENT)
Dept: PHYSICAL MEDICINE AND REHAB | Facility: CLINIC | Age: 67
End: 2022-08-25

## 2022-08-25 VITALS
SYSTOLIC BLOOD PRESSURE: 160 MMHG | OXYGEN SATURATION: 98 % | RESPIRATION RATE: 17 BRPM | TEMPERATURE: 97.3 F | HEART RATE: 88 BPM | DIASTOLIC BLOOD PRESSURE: 86 MMHG

## 2022-08-25 DIAGNOSIS — I50.9 HEART FAILURE, UNSPECIFIED: ICD-10-CM

## 2022-08-25 DIAGNOSIS — Q72.41 LONGITUDINAL REDUCTION DEFECT OF RIGHT FEMUR: ICD-10-CM

## 2022-08-25 DIAGNOSIS — R26.9 UNSPECIFIED ABNORMALITIES OF GAIT AND MOBILITY: ICD-10-CM

## 2022-08-25 DIAGNOSIS — I10 ESSENTIAL (PRIMARY) HYPERTENSION: ICD-10-CM

## 2022-08-25 PROCEDURE — 99204 OFFICE O/P NEW MOD 45 MIN: CPT

## 2022-08-25 RX ORDER — DOXAZOSIN 2 MG/1
2 TABLET ORAL
Refills: 0 | Status: ACTIVE | COMMUNITY

## 2022-08-25 RX ORDER — CARVEDILOL 3.12 MG/1
TABLET, FILM COATED ORAL
Refills: 0 | Status: ACTIVE | COMMUNITY

## 2022-08-25 RX ORDER — ISOSORBIDE MONONITRATE 60 MG/1
60 TABLET, EXTENDED RELEASE ORAL
Refills: 0 | Status: ACTIVE | COMMUNITY

## 2022-08-25 RX ORDER — SACUBITRIL AND VALSARTAN 97; 103 MG/1; MG/1
97-103 TABLET, FILM COATED ORAL
Refills: 0 | Status: ACTIVE | COMMUNITY

## 2022-08-25 RX ORDER — NIFEDIPINE 90 MG/1
90 TABLET, EXTENDED RELEASE ORAL
Refills: 0 | Status: ACTIVE | COMMUNITY

## 2022-08-25 RX ORDER — ASPIRIN 81 MG
81 TABLET, DELAYED RELEASE (ENTERIC COATED) ORAL
Refills: 0 | Status: ACTIVE | COMMUNITY

## 2022-08-25 NOTE — HISTORY OF PRESENT ILLNESS
[FreeTextEntry1] : Patient is a 67-year-old right-hand-dominant male history of hypertension, CHF, left ALICIA, right PFFD who presents today for prosthetic evaluation.  The patient's right lower extremity prosthesis is approximately 6 to 7 years old.  Patient states that the socket is tight, worn and all the straps are broken.  The anterior shell is not fitting well and feels it may be affecting the alignment of his prosthetic foot.  No pain complaints, no skin breakdown, no falls.  Patient reports gaining approximately 5 to 10 pounds.  Functionally the patient is an independent ambulator in the home without an assistive device, straight axis cane in the community.  Patient is independent in transfers and all activities of daily living.  Patient can negotiate all environmental barriers such as curbs and ramps.  Independent on stairs.  Patient lives with his wife and children in a third floor elevated apartment.  No home health aide is present.

## 2022-08-25 NOTE — PHYSICAL EXAM
[FreeTextEntry1] : General: Well-developed male in no apparent distress.  Patient is awake, alert, and oriented x3.  Cooperative.\par HEENT: Normocephalic, atraumatic.  MMM\par Lungs: Clear to auscultation.\par Cardiac: Regular rate and rhythm.\par Abdomen: Bowel sounds present, nondistended.\par Extremities: Edema noted in the left lower extremity.\par \par Right lower extremity: Patient with right proximal femoral focal deficiency with a very proximal knee joint which can flex to about 90 degrees.  Hip flexion 3/5 MP.  Right ankle with 5/5 MP and plantar flexion contracture noted.\par \par Motor:\par Both upper extremities: Tone normal, active range of motion within functional limits with 5/5 motor power throughout.  Thumb to digit opposition intact bilaterally.\par Left lower extremity: Tone normal, active range of motion within functional limits with 5/5 motor power throughout.\par \par Sensory: Intact to light touch and pinprick in both lower extremities.\par \par Functional status: Patient ambulated independently without an assistive device with right lower extremity prosthesis.  Patient with significant right Trendelenburg gait, heel strike intact.  Patient is a K3 ambulator.

## 2022-08-25 NOTE — ASSESSMENT
[FreeTextEntry1] : Patient is a 67-year-old male history of hypertension, CHF, left ALICIA, right PFFD who is current right lower extremity prosthesis is approximately 6 to 7 years old.  The prosthesis is worn, ill fitting, all straps are broken and requires replacement for safe ambulation.  Patient is a K3 ambulator.  Prescription provided for a right custom molded endoskeletal prosthesis with a total contact ischial containment acrylic socket, Chopart style prosthesis with anterior access.  Ultralight alignment components, K3 prosthetic foot, outer protective cover.\par \par I spent a total of 45 minutes on the date of the encounter evaluating and treating the patient including a discussion of treatment options.

## 2022-08-31 ENCOUNTER — OUTPATIENT (OUTPATIENT)
Dept: OUTPATIENT SERVICES | Facility: HOSPITAL | Age: 67
LOS: 1 days | End: 2022-08-31
Payer: COMMERCIAL

## 2022-08-31 ENCOUNTER — TRANSCRIPTION ENCOUNTER (OUTPATIENT)
Age: 67
End: 2022-08-31

## 2022-08-31 VITALS
HEART RATE: 72 BPM | SYSTOLIC BLOOD PRESSURE: 137 MMHG | RESPIRATION RATE: 18 BRPM | OXYGEN SATURATION: 100 % | DIASTOLIC BLOOD PRESSURE: 68 MMHG

## 2022-08-31 VITALS
HEIGHT: 68 IN | WEIGHT: 188.05 LBS | RESPIRATION RATE: 16 BRPM | HEART RATE: 85 BPM | DIASTOLIC BLOOD PRESSURE: 81 MMHG | TEMPERATURE: 98 F | OXYGEN SATURATION: 98 % | SYSTOLIC BLOOD PRESSURE: 139 MMHG

## 2022-08-31 DIAGNOSIS — Z95.5 PRESENCE OF CORONARY ANGIOPLASTY IMPLANT AND GRAFT: Chronic | ICD-10-CM

## 2022-08-31 DIAGNOSIS — R07.89 OTHER CHEST PAIN: ICD-10-CM

## 2022-08-31 LAB
ANION GAP SERPL CALC-SCNC: 11 MMOL/L — SIGNIFICANT CHANGE UP (ref 5–17)
BUN SERPL-MCNC: 13 MG/DL — SIGNIFICANT CHANGE UP (ref 7–23)
CALCIUM SERPL-MCNC: 8.8 MG/DL — SIGNIFICANT CHANGE UP (ref 8.4–10.5)
CHLORIDE SERPL-SCNC: 104 MMOL/L — SIGNIFICANT CHANGE UP (ref 96–108)
CO2 SERPL-SCNC: 24 MMOL/L — SIGNIFICANT CHANGE UP (ref 22–31)
CREAT SERPL-MCNC: 0.95 MG/DL — SIGNIFICANT CHANGE UP (ref 0.5–1.3)
EGFR: 88 ML/MIN/1.73M2 — SIGNIFICANT CHANGE UP
GLUCOSE SERPL-MCNC: 110 MG/DL — HIGH (ref 70–99)
HCT VFR BLD CALC: 40.9 % — SIGNIFICANT CHANGE UP (ref 39–50)
HGB BLD-MCNC: 12.8 G/DL — LOW (ref 13–17)
MCHC RBC-ENTMCNC: 26.1 PG — LOW (ref 27–34)
MCHC RBC-ENTMCNC: 31.3 GM/DL — LOW (ref 32–36)
MCV RBC AUTO: 83.5 FL — SIGNIFICANT CHANGE UP (ref 80–100)
NRBC # BLD: 0 /100 WBCS — SIGNIFICANT CHANGE UP (ref 0–0)
PLATELET # BLD AUTO: 184 K/UL — SIGNIFICANT CHANGE UP (ref 150–400)
POTASSIUM SERPL-MCNC: 3.6 MMOL/L — SIGNIFICANT CHANGE UP (ref 3.5–5.3)
POTASSIUM SERPL-SCNC: 3.6 MMOL/L — SIGNIFICANT CHANGE UP (ref 3.5–5.3)
RBC # BLD: 4.9 M/UL — SIGNIFICANT CHANGE UP (ref 4.2–5.8)
RBC # FLD: 13.9 % — SIGNIFICANT CHANGE UP (ref 10.3–14.5)
SODIUM SERPL-SCNC: 139 MMOL/L — SIGNIFICANT CHANGE UP (ref 135–145)
WBC # BLD: 3.78 K/UL — LOW (ref 3.8–10.5)
WBC # FLD AUTO: 3.78 K/UL — LOW (ref 3.8–10.5)

## 2022-08-31 PROCEDURE — 80048 BASIC METABOLIC PNL TOTAL CA: CPT

## 2022-08-31 PROCEDURE — 99261: CPT

## 2022-08-31 PROCEDURE — C1887: CPT

## 2022-08-31 PROCEDURE — C1894: CPT

## 2022-08-31 PROCEDURE — 93010 ELECTROCARDIOGRAM REPORT: CPT

## 2022-08-31 PROCEDURE — 85027 COMPLETE CBC AUTOMATED: CPT

## 2022-08-31 PROCEDURE — 93458 L HRT ARTERY/VENTRICLE ANGIO: CPT

## 2022-08-31 PROCEDURE — 99152 MOD SED SAME PHYS/QHP 5/>YRS: CPT

## 2022-08-31 PROCEDURE — C1769: CPT

## 2022-08-31 PROCEDURE — 93458 L HRT ARTERY/VENTRICLE ANGIO: CPT | Mod: 26

## 2022-08-31 PROCEDURE — 93005 ELECTROCARDIOGRAM TRACING: CPT

## 2022-08-31 RX ORDER — HYDRALAZINE HCL 50 MG
0 TABLET ORAL
Qty: 0 | Refills: 0 | DISCHARGE

## 2022-08-31 RX ORDER — CARVEDILOL PHOSPHATE 80 MG/1
1 CAPSULE, EXTENDED RELEASE ORAL
Qty: 0 | Refills: 0 | DISCHARGE

## 2022-08-31 RX ORDER — NIFEDIPINE 30 MG
1 TABLET, EXTENDED RELEASE 24 HR ORAL
Qty: 0 | Refills: 0 | DISCHARGE

## 2022-08-31 RX ORDER — SACUBITRIL AND VALSARTAN 24; 26 MG/1; MG/1
0 TABLET, FILM COATED ORAL
Qty: 0 | Refills: 0 | DISCHARGE

## 2022-08-31 RX ORDER — ISOSORBIDE MONONITRATE 60 MG/1
1 TABLET, EXTENDED RELEASE ORAL
Qty: 0 | Refills: 0 | DISCHARGE

## 2022-08-31 RX ORDER — ERGOCALCIFEROL 1.25 MG/1
1 CAPSULE ORAL
Qty: 0 | Refills: 0 | DISCHARGE

## 2022-08-31 RX ORDER — DOXAZOSIN MESYLATE 4 MG
1 TABLET ORAL
Qty: 0 | Refills: 0 | DISCHARGE

## 2022-08-31 RX ORDER — SODIUM CHLORIDE 9 MG/ML
3 INJECTION INTRAMUSCULAR; INTRAVENOUS; SUBCUTANEOUS EVERY 8 HOURS
Refills: 0 | Status: DISCONTINUED | OUTPATIENT
Start: 2022-08-31 | End: 2022-09-14

## 2022-08-31 NOTE — H&P CARDIOLOGY - HISTORY OF PRESENT ILLNESS
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 Patient reports     Patient was evaluated by his cardiologist, Dr Friedman who recommended NST which was completed on 8/1/2022 (see results). Patient is here today for [facility] for further ischemic evaluation     IMPRESSIONS: Abnormal Study  * Symptom: No Symptoms.  * HR Response: Appropriate; BP Response: Appropriate.  * Heart Rhythm: Sinus Rhythm - 84 BPM.  * Conduction defects: IVCD.  * Q Waves: Cannot rule out anterior wall MI.  * Baseline ECG: Nonspecific ST-T wave abnormality.  * ECG Changes: No significant ischemic ST segment changes  beyond baseline abnormalities.  * Arrhythmia: Occasional VPDs occurred during stress and  recovery.  * The left ventricle was normal in size.  * There are medium-sized, mild defects in the  anterolateral and basal anterior walls that are reversible  suggestive of ischemia.  * There is a small, moderate defect in the apex that is  partially reversible suggestive of infarct with moderate  kayleen-infarct ischemia.  * There are large, moderate to severe defects in the basal  to mid inferior, basal to mid inferoseptal, and basal to  mid inferolateral walls that mostly fixed suggestive of  infarct with mild kayleen-infarct ischemia.  * Post-stress gated wall motion analysis was performed  (LVEF = 48 %;LVEDV = 114 ml.) revealing hypokinesis of the  basal to mid inferior, basal to mid inferolateral, basal  inferoseptal, and apical walls.  *** No previous Nuclear/Stress exam.  ------------------------------------------------------------------------  Confirmed on  8/1/2022 - 18:38:17 by Delano Sheth M.D.      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 Patient reports MICHEL with ambulation and mild exertion. Patient was evaluated by his cardiologist, Dr Friedman who recommended NST which was completed on 8/1/2022 (see results). Patient is here today for [facility] for further ischemic evaluation     IMPRESSIONS: Abnormal Study  * Symptom: No Symptoms.  * HR Response: Appropriate; BP Response: Appropriate.  * Heart Rhythm: Sinus Rhythm - 84 BPM.  * Conduction defects: IVCD.  * Q Waves: Cannot rule out anterior wall MI.  * Baseline ECG: Nonspecific ST-T wave abnormality.  * ECG Changes: No significant ischemic ST segment changes  beyond baseline abnormalities.  * Arrhythmia: Occasional VPDs occurred during stress and  recovery.  * The left ventricle was normal in size.  * There are medium-sized, mild defects in the  anterolateral and basal anterior walls that are reversible  suggestive of ischemia.  * There is a small, moderate defect in the apex that is  partially reversible suggestive of infarct with moderate  kayleen-infarct ischemia.  * There are large, moderate to severe defects in the basal  to mid inferior, basal to mid inferoseptal, and basal to  mid inferolateral walls that mostly fixed suggestive of  infarct with mild kayleen-infarct ischemia.  * Post-stress gated wall motion analysis was performed  (LVEF = 48 %;LVEDV = 114 ml.) revealing hypokinesis of the  basal to mid inferior, basal to mid inferolateral, basal  inferoseptal, and apical walls.  *** No previous Nuclear/Stress exam.  ------------------------------------------------------------------------  Confirmed on  8/1/2022 - 18:38:17 by Delano Sheth M.D.

## 2022-08-31 NOTE — ASU PATIENT PROFILE, ADULT - FALL HARM RISK - UNIVERSAL INTERVENTIONS
Bed in lowest position, wheels locked, appropriate side rails in place/Call bell, personal items and telephone in reach/Instruct patient to call for assistance before getting out of bed or chair/Non-slip footwear when patient is out of bed/Center to call system/Physically safe environment - no spills, clutter or unnecessary equipment/Purposeful Proactive Rounding/Room/bathroom lighting operational, light cord in reach

## 2022-08-31 NOTE — ASU DISCHARGE PLAN (ADULT/PEDIATRIC) - NS MD DC FALL RISK RISK
For information on Fall & Injury Prevention, visit: [email or web address] OR  [email or web address] OR  https://www.cdc.gov/steadi/patient.html

## 2022-08-31 NOTE — ASU PATIENT PROFILE, ADULT - HEALTHCARE INFORMATION NEEDED, PROFILE
Patient: Bart Mclean Date: 2020   : 1937    83 year old male      OUTPATIENT WOUND CARE PROGRESS NOTE     This visit is being performed via video to discuss wounds.    Clinician Location: [facility]  Hyperbaric Medicine and Wound Care        Supervising Wound Care / Hyperbaric Medicine Physician: Dr. Jose Garza  Consulting Provider:    Date of Consultation/Last Comprehensive Exam:  10/25/18, re-consult 19  Referring  Provider:  Zain Rahman MD (PCP)    SUBJECTIVE:    Chief Complaint:      Wound/Ulcer Present:    Diabetic lower extremity ulcer:  Thompson grade 2 DFU.    Diabetic foot exam performed?  No.     Current Vascular Assessment:  Ankle-brachial indices (JOSSUE) and Arterial duplex study.     Current Antibiotic Regimen:  None.     Current Offloading Modality:  Offloading shoe.    Additional Wound Category:  Traumatic ulcer     Maximum Baseline Ambulatory Status:  Walks with walker    History of Present Illness:  This is a 83 year old male who has been seen previously by wound team in 2015 for left lower extremity venous ulcer. Pt. Was seen 10/2018 to 2019 and noted to be epithelialized and discharged.    Pt. Returned 19 for re-consultation for multiple wounds after a fall approximately 2-3 weeks prior to re-consultation.      Pt. Seen 20 with follow up video visit.    Pt. Has diabetes, denies tobacco use, reports protein in diet, denies elevating legs, denies fever, reports new wound to LLE on 20 visit.  Pt. Reports he is tolerating dressings with [facility].    Current Treatment Regimen:  Dressing:  Aquacel   Frequency:  Three times a week   Changed by:  [place] [facility] RN    Review of Systems:    Constitutional: Denies fevers or chills.  Respiratory: Denies SOB.  Cardiovascular: Denies chest pain. Reports edema.  Integumentary: wounds, drainage.    Past Medical History:   Diagnosis Date   • Arthritis     right arm and cervical spine   •  Cardiomyopathy (CMS/[facility])     ef 35 %   • Cellulitis, trunk    • Chronic kidney disease     stage 3 and has had kidney stones   • Colon polyps    • Congestive cardiac failure (CMS/[facility])    • Depression    • Diabetes mellitus (CMS/[facility])    • Diabetic ulcer of right heel associated with type 2 diabetes mellitus, with muscle involvement without evidence of necrosis (CMS/HCC) 10/25/2018   • Diabetic ulcer of toe of left foot associated with type 2 diabetes mellitus, with necrosis of bone (CMS/[facility])    • Essential (primary) hypertension    • Fracture    • Memory deficit     mild   • Other and unspecified hyperlipidemia    • Personal history of traumatic fracture     R 4th finger, foot and ankle   • Pneumonia    • PVD (peripheral vascular disease) (CMS/[facility])    • Restless leg syndrome    • S/P CABG x 4 2015   • Sleep apnea     no c pap   • SOB (shortness of breath)     mild, with exertion, accompanied with mild chest pain     Past Surgical History:   Procedure Laterality Date   • Appendectomy     • Cabg, arterial, four+     • Cardiac catherization     • Cdl cath poss ptca  2015   • Colonoscopy     • Eye surgery Bilateral     cataracts removed   • Eye surgery Bilateral     laser surgery   • Kidney stone surgery     • Removal gallbladder     • Tonsillectomy and adenoidectomy       Social History     Socioeconomic History   • Marital status: /Civil Union     Spouse name: Not on file   • Number of children: Not on file   • Years of education: Not on file   • Highest education level: Not on file   Occupational History   • Occupation: retired   Social Needs   • Financial resource strain: Not on file   • Food insecurity:     Worry: Not on file     Inability: Not on file   • Transportation needs:     Medical: Not on file     Non-medical: Not on file   Tobacco Use   • Smoking status: Former Smoker     Packs/day: 0.00     Types: Cigarettes, Cigars     Last attempt to quit: 1972     Years since quittin.4    • Smokeless tobacco: Former User     Types: Chew     Quit date: 2016   Substance and Sexual Activity   • Alcohol use: No     Alcohol/week: 0.0 standard drinks     Frequency: Monthly or less     Drinks per session: 1 or 2     Binge frequency: Never   • Drug use: No   • Sexual activity: Not Currently   Lifestyle   • Physical activity:     Days per week: Not on file     Minutes per session: Not on file   • Stress: Not on file   Relationships   • Social connections:     Talks on phone: Not on file     Gets together: Not on file     Attends Bahai service: Not on file     Active member of club or organization: Not on file     Attends meetings of clubs or organizations: Not on file     Relationship status: Not on file   • Intimate partner violence:     Fear of current or ex partner: Not on file     Emotionally abused: Not on file     Physically abused: Not on file     Forced sexual activity: Not on file   Other Topics Concern   •  Service Yes   • Blood Transfusions No   • Caffeine Concern Yes   • 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, radiation   • Hobby Hazards No   • Sleep Concern Yes     Comment: sleep apnea   • Stress Concern Yes   • Weight Concern Yes   • Special Diet No   • Back Care No   • Exercise No   • Bike Helmet No   • Seat Belt Yes   • Self-Exams No   Social History Narrative   • Not on file     Family History   Problem Relation Age of Onset   • Kidney disease Mother    • COPD Father    • Heart disease Father    • Kidney disease Sister    • Heart disease Sister    • Diabetes Sister    • Heart disease Paternal Grandmother    • Aneurysm Paternal Grandmother         aortic aneurysm ruptured   • Multiple Sclerosis Sister    • Psychiatric Maternal Uncle         alzheimers   • Cancer Paternal Aunt         skin and colon     Current Outpatient Medications   Medication Sig   • HM ASPIRIN 81 MG chewable tablet CHEW 1 TABLET BY MOUTH ONCE DAILY   • furosemide (LASIX) 80 MG tablet Take 1 tablet by  mouth 3 times daily. Do not start before February 12, 2020.   • buPROPion (WELLBUTRIN XL) 150 MG 24 hr tablet Take 1 tablet by mouth 2 times daily. Indications: Major Depressive Disorder   • famotidine (PEPCID) 20 MG tablet Take 20 mg by mouth nightly as needed.   • Multiple Vitamins-Minerals (MULTIVITAMIN ADULT PO) Take by mouth daily.   • carvedilol (COREG) 3.125 MG tablet Take 1 tablet by mouth 2 times daily (with meals).   • potassium chloride (K-DUR,KLOR-CON) 20 MEQ CR tablet Take 1 tablet by mouth 2 times daily.   • insulin glargine (LANTUS) 100 UNIT/ML injection Inject 15 Units into the skin nightly. (Patient taking differently: Inject 10 Units into the skin nightly. )   • cholecalciferol (VITAMIN D3) 1000 UNITS tablet Take 2,000 Units by mouth daily.   • omeprazole (PRILOSEC) 20 MG capsule Take 20 mg by mouth daily.   • donepezil (ARICEPT) 5 MG tablet Take 5 mg by mouth nightly.   • NYSTOP (NYSTOP) 867265 UNIT/GM Powder Apply 1 application topically 3 times daily.   • fenofibrate (TRICOR) 54 MG tablet Take 54 mg by mouth daily.   • rosuvastatin (CRESTOR) 20 MG tablet Take 1 mg by mouth daily.   • FLUoxetine (PROZAC) 20 MG capsule Take 40 mg by mouth daily.     No current facility-administered medications for this encounter.       ALLERGIES:  Latex   (environmental)    OBJECTIVE:  Vital Signs:    There were no vitals taken for this visit.     Physical Exam:  General appearance: Appears stated age, obese, oriented to person, place, time and situation, in no distress and cooperative  Head:   normocephalic without obvious abnormality  Mouth:   patent  Pulmonary: normal respiratory effort     Wound Assessment:    Via video visit has what appears to be superficial granular wound to RLE.  Reports left foot wounds have alejandro-epithelialization.  LLE has nonviable area with serous weeping.                Wound Bed Quality:  N/A      Glenny-wound Quality:    N/A    Additional Descriptors:  N/A    Wound Measurements Per  Flowsheet:    Old Wound Toe, 2nd Left Dorsal (Active)   Number of days: 1698     Wound Buttock Right Pressure Injury (Active)   Number of days: 520       Wound Leg Left Anterior;Lower (Active)   Wound Length (cm) 7 cm 1/29/2020 10:13 AM   Wound Width (cm) 9.5 cm 1/29/2020 10:13 AM   Wound Depth (cm) 0.1 cm 1/29/2020 10:13 AM   Wound Surface Area (cm^2) 66.5 cm^2 1/29/2020 10:13 AM   Wound Volume (cm^3) 6.65 cm^3 1/29/2020 10:13 AM   Number of days:        Wound Toe, 2nd Left Dorsal (Active)   Wound Length (cm) 0.9 cm 1/29/2020 10:13 AM   Wound Width (cm) 0.7 cm 1/29/2020 10:13 AM   Wound Depth (cm) 0.1 cm 1/29/2020 10:13 AM   Wound Surface Area (cm^2) 0.63 cm^2 1/29/2020 10:13 AM   Wound Volume (cm^3) 0.06 cm^3 1/29/2020 10:13 AM   Number of days: 150       Wound Leg Right Lower;Anterior (Active)   Wound Length (cm) 0 cm 1/29/2020 10:13 AM   Wound Width (cm) 0 cm 1/29/2020 10:13 AM   Wound Depth (cm) 0 cm 1/29/2020 10:13 AM   Wound Surface Area (cm^2) 0 cm^2 1/29/2020 10:13 AM   Wound Volume (cm^3) 0 cm^3 1/29/2020 10:13 AM   Number of days: 150       Wound Leg Left Posterior;Lower Venous Ulcer (Active)   Wound Length (cm) 3.3 cm 1/29/2020 10:13 AM   Wound Width (cm) 6.2 cm 1/29/2020 10:13 AM   Wound Depth (cm) 0.1 cm 1/29/2020 10:13 AM   Wound Surface Area (cm^2) 20.46 cm^2 1/29/2020 10:13 AM   Wound Volume (cm^3) 2.05 cm^3 1/29/2020 10:13 AM   Number of days: 121       Wound Sacrum Right Pressure Injury (Active)   Wound Length (cm) 0.3 cm 2/20/2020  1:00 PM   Wound Width (cm) 0.2 cm 2/20/2020  1:00 PM   Wound Depth (cm) 0.1 cm 2/20/2020  1:00 PM   Wound Surface Area (cm^2) 0.06 cm^2 2/20/2020  1:00 PM   Wound Volume (cm^3) 0.01 cm^3 2/20/2020  1:00 PM   Number of days: 121       Wound Arm Left Lower Skin Tear (Active)   Wound Length (cm) 1.6 cm 2/20/2020  1:00 PM   Wound Width (cm) 3.6 cm 2/20/2020  1:00 PM   Wound Depth (cm) 0 cm 2/20/2020  1:00 PM   Wound Surface Area (cm^2) 5.76 cm^2 2/20/2020  1:00 PM   Wound  Volume (cm^3) 0 cm^3 2/20/2020  1:00 PM   Number of days:      PROCEDURE:  None performed    Procedure was Performed by:  Not applicable    Laboratory assessments reviewed:  PREALBUMIN (mg/dL)   Date Value   10/25/2018 19.6      Albumin (g/dL)   Date Value   12/22/2015 2.8 (L)      No results available in last 24 hours    Lab Results   Component Value Date    WBC 6.7 02/10/2020    GLUCOSE 92 02/11/2020    HGBA1C 5.6 10/25/2018    CRP 0.9 10/25/2018    RESR 56 (H) 10/25/2018    CREATININE 2.21 (H) 02/11/2020    GFRA 30 01/20/2020    GFRNA 26 01/20/2020        Culture results:  Specimen Description (no units)   Date Value   02/13/2019 BONE TOE   12/26/2018 SWAB FOOT LEFT 5TH TOE   09/05/2013 SWAB LEG LEFT DEEP WOUND    CULTURE (no units)   Date Value   02/13/2019 VERY RARE STAPHYLOCOCCUS COHNII SSP.UREALYTICUS (P)   02/13/2019 FEW CORYNEBACTERIUM STRIATUM (P)   02/13/2019 RARE DIPHTHEROIDS (P)   02/13/2019 NO ANAEROBES ISOLATED        Diagnostic Assessments Reviewed:  X -ray (s)  and Vascular Studies:  Ankle-brachial indices (JOSSUE), CT angiogram and Arterial duplex study    12/19/18 JOSSUE and RLE Arterial Duplex  IMPRESSION:      Right JOSSUE 0.67 consistent with moderate resting arterial insufficiency.     Moderate scattered atherosclerosis of the right lower extremity without  evidence of focal hemodynamically significant stenosis. Monophasic  waveforms in the distal peroneal and posterior tibial arteries are  suggestive of diffuse upstream disease.     Left JOSSUE 0.50 consistent with moderate to severe resting arterial  Insufficiency.    12/26/18 XR left foot  IMPRESSION:    1. No acute fracture or malalignment.  2. No destructive osseous lesion to suggest osteomyelitis. If there is  persistent clinical concern, bone scan/white blood cell scan or MRI may be  considered as they are more sensitive and specific for early changes of  Osteomyelitis.    1/3/19 LLE arterial duplex  IMPRESSION:  1. There is no sonographic evidence  of hemodynamically significant arterial  inflow disease to the level of the left knee.  2. There is a constellation of findings suggesting at least moderate  scattered left below-knee arterial disease including a probable 70%  proximal focal anterior tibial stenosis and distal peroneal occlusion.  3. There is an abnormal cardiac rhythm of uncertain clinical significance.  Recommend correlation to history and potential EKG.    2/13/19 Surgical Pathology  Bone, left third toe, biopsy:     -Findings consistent with acute osteomyelitis.       6/27/19 x-ray right heel  FINDINGS: 2 views of right heel were obtained. Bone is osteopenic. There is  no definite fracture or bony destruction. Plantar calcaneal spur is seen.  Enthesophyte is also seen at posterior calcaneus. There is vascular  calcifications.  CONCLUSIONS: No obvious bony destructions.    1/9/20 Angiogram  IMPRESSION:  1. Right lower extremity: No evidence of fluid in the cul-de-sac  significant inflow disease. There is mild stenosis in the distal  superficial femoral artery. Evaluation of the runoff vasculature is very  limited given medial calcinosis and small vessel size.  2. Left lower extremity: No evidence of hemodynamically significant inflow  or femoropopliteal outflow peripheral arterial disease. There is  hemodynamically significant peripheral arterial disease in the runoff  vasculature with occlusion of the peroneal artery and proximal posterior  tibial artery. The distal posterior tibial artery is reconstituted from  collaterals. The anterior tibial and dorsalis pedis arteries are patent.  3. Diffuse subcutaneous edema and soft tissue thickening involving  bilateral lower extremities, left worse than right.  4. Bilateral renal artery stenosis, left worse than right.  5. Sub-5 mm micronodules are noted in the lung bases. Correlation with  dedicated chest imaging would be helpful.  6. Please see the body the report for further details.     5/27/20 BLE  Venous insufficiency  IMPRESSION:  1. No significant deep or superficial venous reflux of either lower  extremity.     2. The distal greater saphenous veins may be diminutive or previously  ablated.     3. No DVT of either lower extremity.     3. Biphasic waveforms. Consider elevated right heart pressure as an  alternative source of bilateral lower extremity edema.    Nutritional Assessment:  Prealbumin and/or Albumin reviewed    Wound treatment goals are palliative:  No    DIAGNOSES:  Diabetic lower extremity ulcer, Thompson grade 2 (ulcer extension- involves ligament, tendon, joint capsule or fascia) R leg  Edema     Medical Decision Makin/29/20 - had video visit, has wounds to BLE.    Local Wound Care  BLE - Drawtext, ABD, kerlex gauze roll, tape to kerlex, Change 3 times per week and PRN by [facility] RN.    Risk with Open Wound  As discussed, with open skin there is always a risk of infection.  Infection may lead to need of surgical debridement or possible amputation.  Infection can possibly lead sepsis and subsequently death or permanent disability.     Tobacco Cessation  Pt. Denies current tobacco use.    Edema  No compression at this time given arterial status per cardiovacular  Elevate your extremities as much as possible.  Heel above hip when sitting and heel above heart when sleeping.    Off-loading   DH shoes    Diabetes   Recommend tight diabetic control. Goal is for blood glucose <150.   A1c ordered 10/25/18, 10/25/18 results 5.6%, in good control.    Nutrition   Consume protein daily in your diet.  Also try to drink a protein shake daily and take a multivitamin.  You must consume nutrition regularly three times a day to aid in wound healing.  Recommended 100g per day.     Smoking cessation   Denies current tobacco use.      Vascular   Right DP and PT with monophasic doppler signals.  Abnormal JOSSUE with results suggesting significant vascular insufficiency from 18, in setting of  deteriorating wounds.  Consult to IR placed 12/26/18, had appointment 1/3/19, LLE angiogram scheduled for 1/16/19, but was cancelled due to cellulitis to pannus.    Pt. was seen by Dr. Uriarte 2/20/19. Dr. Uriarte recommended a peripheral angiogram but the patient still has not had this scheduled.    12/31/19 - Order placed and called Dr. Uriarte about consulting for angiogram with intervention if possible, left leg preferred to be intervened upon first given more wounds.    1/8/20 CT angiogram results noted above, pt. Following with Dr. Uriarte for intervention angiogram on 2/10/20, but no interventions were performed on 2/10/20 per report.    Infectious Disease   Right heel x-ray 11/21/18 negative for Ostemyelitis  Left foot x-ray 12/26/18 negative for Osteomyelitis  Culture of Left 5th toe 12/26/18, results sensitive to gentamicin, started gentamicin 12/31/18 with dressings, stopped gentamicin on 1/14/19 visit.    Cellulitis of pannus - s/p doxycyline for 14 day course started 1/16/19    Left 3rd toe bone culture and pathology sent on 2/13/19, found to have osteomyelitis, pt. Was treated on doxycyline, was referred to ID, had appointment with Dr. Colon on 3/26/19, did not go to the appt. The LD3 wound is completely healed as of 6/18/19.      Right heel with seropurulent drainage 7/2/19, wound does not probe, ordered 10 day course of doxycyline.    Right heel much improved on 7/18/19, s/p course of doxycyline, does not appear infected on 8/15/19.    12/31/19 - pannus shows cellulitis with irritation, ordered 14 day course of doxycyline, sent to pharmacy.    1/15/20 - pt. Reports he has a few days left of doxycyline, pannus much improved on exam.    1/29/20, 2/20/20 - pannus does not appear infected on exam today.    5/29/20 - wounds do not appear infected on video visit today, pt. Denies fever.    Imaging  XR Left  Foot 12/26/18 noted above  XR Right heel 11/21/18 noted above    MRI ordered 1/14/19,  central scheduling card given 1/14/19 so pt. Can make appiontment, still needs to do this. The patient never made an appt for the MRI. All wounds were healed in March 2019.     X-ray right heel ordered 6/25/19, 6/27/19 results noted above shows heel spur.    1/8/20 CT angiogram noted above, LLE has multiple areas of stenosis/occlusion infrapopliteal vasculature.    2/10/20 interventional angiogram done, no interventions performed per report.    5/27/20 BLE Venous insufficiency - no significant deep or superficial venous reflux of either lower extremity.    Consults  Pt. Following with with Dr. Uriarte.  Order placed 12/31/19 and called Dr. Uriarte about consulting for angiogram with intervention if possible, left leg preferred to be intervened upon first given more wounds.  Pt. Had appointment on 1/8/20, had follow up 1/15/20, had angiogram scheduled on 2/10/20, no intervention performed per report.    Follow-up   3 weeks for video visit per patient preference.  Will have orders sent to [facility].    Plan of Care:  Advanced Wound Care Recommendations:  As above  Percent Wound Closure from consult:  As above  Care plan to augment wound closure:  Not applicable.  as above    All questions were answered.    Portions of this note are brought forward from Dr. Erich Marino MD's note; reviewed and edited by me as appropriate.     Jose Garza D.O.  [facility]  [phone]     none

## 2022-08-31 NOTE — ASU DISCHARGE PLAN (ADULT/PEDIATRIC) - CARE PROVIDER_API CALL
Symone Friedman  CARDIOLOGY  [address], [address]  [place] [address]  Phone: [phone]  Fax: [phone]  Established Patient  Follow Up Time: 2 weeks

## 2023-03-29 NOTE — H&P PST ADULT - PRESSURE ULCER(S)
Pt was having a mental breakdown with an extreme anxiety attach. With my mental tricks and breathing techniques I provided he is doing much better but has no way to come in. We spoke for about 15 min as I made sure he is doing better, but he will need a note for work today. Is there a way I can write him a work note for today?     After talking to Triston I was able to send him a note for work for today and sent it his mychart.    no

## 2023-05-15 NOTE — ASU PATIENT PROFILE, ADULT - NSCAFFEINETYPE_GEN_ALL_CORE_SD
coffee Double O-Z Plasty Text: The defect edges were debeveled with a #15 scalpel blade.  Given the location of the defect, shape of the defect and the proximity to free margins a Double O-Z plasty (double transposition flap) was deemed most appropriate.  Using a sterile surgical marker, the appropriate transposition flaps were drawn incorporating the defect and placing the expected incisions within the relaxed skin tension lines where possible. The area thus outlined was incised deep to adipose tissue with a #15 scalpel blade.  The skin margins were undermined to an appropriate distance in all directions utilizing iris scissors.  Hemostasis was achieved with electrocautery.  The flaps were then transposed into place, one clockwise and the other counterclockwise, and anchored with interrupted buried subcutaneous sutures.
